# Patient Record
Sex: FEMALE | Race: BLACK OR AFRICAN AMERICAN | NOT HISPANIC OR LATINO | Employment: FULL TIME | URBAN - METROPOLITAN AREA
[De-identification: names, ages, dates, MRNs, and addresses within clinical notes are randomized per-mention and may not be internally consistent; named-entity substitution may affect disease eponyms.]

---

## 2022-01-02 ENCOUNTER — OFFICE VISIT (OUTPATIENT)
Dept: URGENT CARE | Facility: URGENT CARE | Age: 27
End: 2022-01-02
Payer: COMMERCIAL

## 2022-01-02 VITALS
TEMPERATURE: 100.7 F | OXYGEN SATURATION: 99 % | SYSTOLIC BLOOD PRESSURE: 143 MMHG | RESPIRATION RATE: 20 BRPM | DIASTOLIC BLOOD PRESSURE: 95 MMHG | WEIGHT: 167.25 LBS | HEART RATE: 125 BPM

## 2022-01-02 DIAGNOSIS — R82.90 ABNORMAL FINDING ON URINALYSIS: ICD-10-CM

## 2022-01-02 DIAGNOSIS — M54.6 ACUTE BILATERAL THORACIC BACK PAIN: Primary | ICD-10-CM

## 2022-01-02 LAB
ALBUMIN UR-MCNC: ABNORMAL MG/DL
APPEARANCE UR: CLEAR
BACTERIA #/AREA URNS HPF: ABNORMAL /HPF
BILIRUB UR QL STRIP: NEGATIVE
COLOR UR AUTO: YELLOW
GLUCOSE UR STRIP-MCNC: NEGATIVE MG/DL
HGB UR QL STRIP: ABNORMAL
KETONES UR STRIP-MCNC: >=80 MG/DL
LEUKOCYTE ESTERASE UR QL STRIP: ABNORMAL
MUCOUS THREADS #/AREA URNS LPF: PRESENT /LPF
NITRATE UR QL: POSITIVE
PH UR STRIP: 6 [PH] (ref 5–7)
RBC #/AREA URNS AUTO: ABNORMAL /HPF
SP GR UR STRIP: >=1.03 (ref 1–1.03)
SQUAMOUS #/AREA URNS AUTO: ABNORMAL /LPF
UROBILINOGEN UR STRIP-ACNC: 0.2 E.U./DL
WBC #/AREA URNS AUTO: ABNORMAL /HPF

## 2022-01-02 PROCEDURE — 87086 URINE CULTURE/COLONY COUNT: CPT | Performed by: PHYSICIAN ASSISTANT

## 2022-01-02 PROCEDURE — 81001 URINALYSIS AUTO W/SCOPE: CPT | Performed by: PHYSICIAN ASSISTANT

## 2022-01-02 PROCEDURE — 99203 OFFICE O/P NEW LOW 30 MIN: CPT | Performed by: PHYSICIAN ASSISTANT

## 2022-01-02 RX ORDER — CIPROFLOXACIN 500 MG/1
500 TABLET, FILM COATED ORAL 2 TIMES DAILY
Qty: 20 TABLET | Refills: 0 | Status: SHIPPED | OUTPATIENT
Start: 2022-01-02 | End: 2022-01-12

## 2022-01-02 RX ORDER — IBUPROFEN 600 MG/1
600 TABLET, FILM COATED ORAL EVERY 6 HOURS PRN
Qty: 30 TABLET | Refills: 0 | Status: SHIPPED | OUTPATIENT
Start: 2022-01-02

## 2022-01-02 ASSESSMENT — ENCOUNTER SYMPTOMS
FATIGUE: 0
NECK STIFFNESS: 0
JOINT SWELLING: 0
NECK PAIN: 0
FREQUENCY: 0
DYSURIA: 0
WOUND: 0
COLOR CHANGE: 0
ARTHRALGIAS: 0
CHILLS: 0
FLANK PAIN: 0
FEVER: 1
HEMATURIA: 0
BACK PAIN: 1
MYALGIAS: 1

## 2022-01-02 NOTE — PROGRESS NOTES
Montana Collado is a 26 year old who presents for the following health issues   HPI   Back Pain  Onset/Duration: today  Description:   Location of pain: chronic upper back but new mid and low back  Character of pain: sharp and dull ache  Pain radiation: none  New numbness or weakness in legs, not attributed to pain: No weakness, numbness, tingling.  No bladder or bowel dysfunction.  No swelling, redness, drainage or fevers.  No dysuria, urinary frequency, urgency or hematuria. But has fever here.  Intensity: moderate  Progression of Symptoms: worsening  History:   Specific cause: Yes, was moving this past weekend  Pain interferes with job: YES  History of back problems: Yes prior back problems  Any previous MRI or X-rays: None  Sees a specialist for back pain: No  Alleviating factors:   Improved by: rest    Precipitating factors:  Worsened by: Lifting, Bending, Standing, Walking and Coughing  Therapies tried and outcome: acetaminophen (Tylenol), heat, rest and sitting with minimal relief  Accompanying Signs & Symptoms:  Risk of Fracture: None  Risk of Cauda Equina: None  Risk of Infection: None  Risk of Cancer: None  Risk of Ankylosing Spondylitis: Onset at age <35, male, AND morning back stiffness  no     There is no problem list on file for this patient.    Current Outpatient Medications   Medication     etonogestrel (NEXPLANON) 68 MG IMPL     No current facility-administered medications for this visit.      No Known Allergies  Review of Systems   Constitutional: Positive for fever. Negative for chills and fatigue.   Genitourinary: Negative.  Negative for dysuria, flank pain, frequency, hematuria, urgency, vaginal bleeding and vaginal discharge.   Musculoskeletal: Positive for back pain and myalgias. Negative for arthralgias, gait problem, joint swelling, neck pain and neck stiffness.   Skin: Negative for color change, pallor, rash and wound.   All other systems reviewed and are negative.            Objective    BP (!) 143/95 (BP Location: Left arm, Patient Position: Sitting, Cuff Size: Adult Regular)   Pulse (!) 125   Temp (!) 100.7  F (38.2  C) (Oral)   Resp 20   Wt 75.9 kg (167 lb 4 oz)   SpO2 99%   Breastfeeding No   There is no height or weight on file to calculate BMI.  Physical Exam  Vitals and nursing note reviewed.   Constitutional:       General: She is not in acute distress.     Appearance: Normal appearance. She is well-developed and normal weight. She is not ill-appearing.   Cardiovascular:      Rate and Rhythm: Normal rate and regular rhythm.      Pulses: Normal pulses.      Heart sounds: Normal heart sounds, S1 normal and S2 normal. No murmur heard.  No friction rub. No gallop.    Pulmonary:      Effort: Pulmonary effort is normal. No accessory muscle usage, respiratory distress or retractions.      Breath sounds: Normal breath sounds. No decreased breath sounds, wheezing, rhonchi or rales.   Abdominal:      General: Abdomen is flat. Bowel sounds are normal.      Palpations: Abdomen is soft. There is no mass.      Tenderness: There is no abdominal tenderness. There is no right CVA tenderness, left CVA tenderness, guarding or rebound. Negative signs include Rueña's sign, Rovsing's sign and McBurney's sign.      Hernia: No hernia is present.   Musculoskeletal:      Thoracic back: Spasms and tenderness present. No swelling, edema, deformity, lacerations or bony tenderness. Decreased range of motion.   Skin:     General: Skin is warm and dry.      Comments: Distal pulses are 2+ and symmetric.  No peripheral edema.   Neurological:      Mental Status: She is alert and oriented to person, place, and time.      Sensory: Sensation is intact.      Motor: Motor function is intact.      Gait: Gait is intact. Gait normal.      Deep Tendon Reflexes: Reflexes are normal and symmetric.      Comments: Negative SLR test.   Psychiatric:         Mood and Affect: Mood normal.         Behavior: Behavior  normal.         Thought Content: Thought content normal.         Judgment: Judgment normal.       Results for orders placed or performed in visit on 01/02/22 (from the past 24 hour(s))   UA Macro with Reflex to Micro and Culture - lab collect    Specimen: Urine, Midstream   Result Value Ref Range    Color Urine Yellow Colorless, Straw, Light Yellow, Yellow    Appearance Urine Clear Clear    Glucose Urine Negative Negative mg/dL    Bilirubin Urine Negative Negative    Ketones Urine >=80 (A) Negative mg/dL    Specific Gravity Urine >=1.030 1.003 - 1.035    Blood Urine Trace (A) Negative    pH Urine 6.0 5.0 - 7.0    Protein Albumin Urine Trace (A) Negative mg/dL    Urobilinogen Urine 0.2 0.2, 1.0 E.U./dL    Nitrite Urine Positive (A) Negative    Leukocyte Esterase Urine Trace (A) Negative   Urine Microscopic Exam   Result Value Ref Range    Bacteria Urine Many (A) None Seen /HPF    RBC Urine 0-2 0-2 /HPF /HPF    WBC Urine 0-5 0-5 /HPF /HPF    Squamous Epithelials Urine Few (A) None Seen /LPF    Mucus Urine Present (A) None Seen /LPF       Assessment/Plan:  Acute bilateral thoracic back pain:  UA and micro is suspicious for UTI/pyelonephritis and will empirically treat with [id number].  Will also give tylenol/ibuprofen as needed for pain/fever.  Will send for UC to help guide abx treatment.  Recommend increase fluids, regular voids, proper wiping techniques and voiding after intercourse.  To the ER if worsening symptoms.  Recheck in clinic if symptoms worsen or if symptoms do not improve.  -     UA Macro with Reflex to Micro and Culture - lab collect; Future  -     UA Macro with Reflex to Micro and Culture - lab collect  -     Urine Microscopic Exam  -     Urine Culture  -     ciprofloxacin (CIPRO) 500 MG tablet; Take 1 tablet (500 mg) by mouth 2 times daily for 10 days  -     ibuprofen (ADVIL/MOTRIN) 600 MG tablet; Take 1 tablet (600 mg) by mouth every 6 hours as needed for moderate pain    Abnormal finding on  urinalysis        Ashley See KADEN Hsieh

## 2022-01-03 LAB — BACTERIA UR CULT: NORMAL

## 2024-02-17 ENCOUNTER — OFFICE VISIT (OUTPATIENT)
Dept: URGENT CARE | Facility: URGENT CARE | Age: 29
End: 2024-02-17
Payer: COMMERCIAL

## 2024-02-17 VITALS
WEIGHT: 153.5 LBS | OXYGEN SATURATION: 100 % | TEMPERATURE: 101 F | HEART RATE: 121 BPM | DIASTOLIC BLOOD PRESSURE: 91 MMHG | SYSTOLIC BLOOD PRESSURE: 136 MMHG

## 2024-02-17 DIAGNOSIS — J45.21 MILD INTERMITTENT ASTHMA WITH ACUTE EXACERBATION: ICD-10-CM

## 2024-02-17 DIAGNOSIS — R06.02 SOB (SHORTNESS OF BREATH): ICD-10-CM

## 2024-02-17 DIAGNOSIS — R50.9 FEVER, UNSPECIFIED FEVER CAUSE: ICD-10-CM

## 2024-02-17 DIAGNOSIS — J06.9 VIRAL UPPER RESPIRATORY TRACT INFECTION WITH COUGH: Primary | ICD-10-CM

## 2024-02-17 LAB
FLUAV AG SPEC QL IA: NEGATIVE
FLUBV AG SPEC QL IA: NEGATIVE

## 2024-02-17 PROCEDURE — 99214 OFFICE O/P EST MOD 30 MIN: CPT | Performed by: PHYSICIAN ASSISTANT

## 2024-02-17 PROCEDURE — 87804 INFLUENZA ASSAY W/OPTIC: CPT | Performed by: PHYSICIAN ASSISTANT

## 2024-02-17 RX ORDER — ALBUTEROL SULFATE 90 UG/1
1-2 AEROSOL, METERED RESPIRATORY (INHALATION)
COMMUNITY
Start: 2023-07-17

## 2024-02-17 RX ORDER — PREDNISONE 20 MG/1
40 TABLET ORAL DAILY
Qty: 10 TABLET | Refills: 0 | Status: SHIPPED | OUTPATIENT
Start: 2024-02-17 | End: 2024-02-22

## 2024-02-17 ASSESSMENT — ENCOUNTER SYMPTOMS
WOUND: 0
ENDOCRINE NEGATIVE: 1
ARTHRALGIAS: 0
CARDIOVASCULAR NEGATIVE: 1
LIGHT-HEADEDNESS: 0
WEAKNESS: 0
NAUSEA: 0
NECK STIFFNESS: 0
BACK PAIN: 0
MUSCULOSKELETAL NEGATIVE: 1
DIARRHEA: 0
ALLERGIC/IMMUNOLOGIC NEGATIVE: 1
NECK PAIN: 0
COUGH: 1
SHORTNESS OF BREATH: 1
FEVER: 1
CHILLS: 0
MYALGIAS: 0
HEADACHES: 0
VOMITING: 0
PALPITATIONS: 0
JOINT SWELLING: 0
DIZZINESS: 0
RHINORRHEA: 0
EYES NEGATIVE: 1
SORE THROAT: 0

## 2024-02-17 NOTE — PROGRESS NOTES
Chief Complaint:     Chief Complaint   Patient presents with    Shortness of Breath     Chest tightens, sob, had sx last yr dx with bronchitis    Fever     Onset - yesterday       Results for orders placed or performed in visit on 02/17/24   Influenza A & B Antigen - Clinic Collect     Status: Normal    Specimen: Nose; Swab   Result Value Ref Range    Influenza A antigen Negative Negative    Influenza B antigen Negative Negative    Narrative    Test results must be correlated with clinical data. If necessary, results should be confirmed by a molecular assay or viral culture.       Medical Decision Making:    Vital signs reviewed by Christian Goodrich PA-C  BP (!) 136/91   Pulse (!) 121   Temp (!) 101  F (38.3  C) (Tympanic)   Wt 69.6 kg (153 lb 8 oz)   SpO2 100%     Differential Diagnosis:  URI Adult/Peds:  Bronchitis-viral, Influenza, Pneumonia, and Viral upper respiratory illness        ASSESSMENT    1. Viral upper respiratory tract infection with cough    2. Mild intermittent asthma with acute exacerbation    3. Fever, unspecified fever cause    4. SOB (shortness of breath)        PLAN    Patient is in no acute distress.    Temp is 101 in clinic today, lung sounds were clear, and O2 sats at 100% on RA.    Asthma is not controlled at this time.  Rx for Prednisone sent in.  Patient declined refill of Albuterol inhaler.    Influenza was negative.  Rest, Push fluids, vaporizer, elevation of head of bed.  Ibuprofen and or Tylenol for any fever or body aches.  Over the counter cough suppressant- PRN- as discussed.   If symptoms worsen, recheck immediately otherwise follow up with your PCP in 1 week if symptoms are not improving.  Worrisome symptoms discussed with instructions to go to the ED.  Patient verbalized understanding and agreed with this plan.    Labs:    Results for orders placed or performed in visit on 02/17/24   Influenza A & B Antigen - Clinic Collect     Status: Normal    Specimen: Nose; Swab   Result  Value Ref Range    Influenza A antigen Negative Negative    Influenza B antigen Negative Negative    Narrative    Test results must be correlated with clinical data. If necessary, results should be confirmed by a molecular assay or viral culture.        Vital signs reviewed by Christian Goodrich PA-C  BP (!) 136/91   Pulse (!) 121   Temp (!) 101  F (38.3  C) (Tympanic)   Wt 69.6 kg (153 lb 8 oz)   SpO2 100%     Current Meds      Current Outpatient Medications:     albuterol (PROAIR HFA/PROVENTIL HFA/VENTOLIN HFA) 108 (90 Base) MCG/ACT inhaler, Inhale 1-2 puffs into the lungs, Disp: , Rfl:     etonogestrel (NEXPLANON) 68 MG IMPL, Inject 1 each Subcutaneous, Disp: , Rfl:     ibuprofen (ADVIL/MOTRIN) 600 MG tablet, Take 1 tablet (600 mg) by mouth every 6 hours as needed for moderate pain, Disp: 30 tablet, Rfl: 0    predniSONE (DELTASONE) 20 MG tablet, Take 2 tablets (40 mg) by mouth daily for 5 days, Disp: 10 tablet, Rfl: 0      Respiratory History    occasional episodes of bronchitis      SUBJECTIVE    HPI: Tobias Haynes is an 28 year old female who presents with chest congestion, cough nonproductive, occasional, fever, and shortness of breath.  Symptoms began 1  days ago and has unchanged.  There is no wheezing and chest pain.  Patient is eating and drinking well.  No fever, nausea, vomiting, or diarrhea.    Patient denies any recent travel or exposure to known COVID positive tested individual.      ROS:     Review of Systems   Constitutional:  Positive for fever. Negative for chills.   HENT:  Positive for congestion. Negative for ear pain, rhinorrhea and sore throat.    Eyes: Negative.    Respiratory:  Positive for cough and shortness of breath.    Cardiovascular: Negative.  Negative for chest pain and palpitations.   Gastrointestinal:  Negative for diarrhea, nausea and vomiting.   Endocrine: Negative.    Genitourinary: Negative.    Musculoskeletal: Negative.  Negative for arthralgias, back pain, joint swelling,  myalgias, neck pain and neck stiffness.   Skin: Negative.  Negative for rash and wound.   Allergic/Immunologic: Negative.  Negative for immunocompromised state.   Neurological:  Negative for dizziness, weakness, light-headedness and headaches.         Family History   No family history on file.     Problem history  There is no problem list on file for this patient.       Allergies  No Known Allergies     Social History  Social History     Socioeconomic History    Marital status: Single     Spouse name: Not on file    Number of children: Not on file    Years of education: Not on file    Highest education level: Not on file   Occupational History    Not on file   Tobacco Use    Smoking status: Every Day     Years: 4     Types: Cigarettes, Vaping Device    Smokeless tobacco: Never   Substance and Sexual Activity    Alcohol use: Yes     Comment: occ    Drug use: Not Currently    Sexual activity: Not on file   Other Topics Concern    Not on file   Social History Narrative    Not on file     Social Determinants of Health     Financial Resource Strain: Not on file   Food Insecurity: Not on file   Transportation Needs: Not on file   Physical Activity: Not on file   Stress: Not on file   Social Connections: Not on file   Interpersonal Safety: Not on file   Housing Stability: Not on file        OBJECTIVE     Vital signs reviewed by Christian Goodrich PA-C  BP (!) 136/91   Pulse (!) 121   Temp (!) 101  F (38.3  C) (Tympanic)   Wt 69.6 kg (153 lb 8 oz)   SpO2 100%      Physical Exam  Vitals and nursing note reviewed.   Constitutional:       General: She is not in acute distress.     Appearance: She is well-developed. She is not ill-appearing, toxic-appearing or diaphoretic.   HENT:      Head: Normocephalic and atraumatic.      Right Ear: Hearing, tympanic membrane, ear canal and external ear normal. Tympanic membrane is not perforated, erythematous, retracted or bulging.      Left Ear: Hearing, tympanic membrane, ear canal  and external ear normal. Tympanic membrane is not perforated, erythematous, retracted or bulging.      Nose: Congestion present. No mucosal edema or rhinorrhea.      Right Sinus: No maxillary sinus tenderness or frontal sinus tenderness.      Left Sinus: No maxillary sinus tenderness or frontal sinus tenderness.      Mouth/Throat:      Pharynx: No pharyngeal swelling, oropharyngeal exudate, posterior oropharyngeal erythema or uvula swelling.      Tonsils: No tonsillar exudate or tonsillar abscesses. 0 on the right. 0 on the left.   Eyes:      General:         Right eye: No discharge.         Left eye: No discharge.      Pupils: Pupils are equal, round, and reactive to light.   Cardiovascular:      Rate and Rhythm: Normal rate and regular rhythm.      Heart sounds: Normal heart sounds. No murmur heard.     No friction rub. No gallop.   Pulmonary:      Effort: Pulmonary effort is normal. No respiratory distress.      Breath sounds: Normal breath sounds. No decreased breath sounds, wheezing, rhonchi or rales.   Chest:      Chest wall: No tenderness.   Abdominal:      General: Bowel sounds are normal. There is no distension.      Palpations: Abdomen is soft. There is no mass.      Tenderness: There is no abdominal tenderness. There is no guarding.   Musculoskeletal:      Cervical back: Normal range of motion and neck supple.   Lymphadenopathy:      Head:      Right side of head: No submental, submandibular, tonsillar, preauricular or posterior auricular adenopathy.      Left side of head: No submental, submandibular, tonsillar, preauricular or posterior auricular adenopathy.      Cervical: No cervical adenopathy.      Right cervical: No superficial or posterior cervical adenopathy.     Left cervical: No superficial or posterior cervical adenopathy.   Skin:     General: Skin is warm and dry.      Findings: No rash.   Neurological:      Mental Status: She is alert and oriented to person, place, and time.      Cranial  Nerves: No cranial nerve deficit.      Deep Tendon Reflexes: Reflexes are normal and symmetric.   Psychiatric:         Behavior: Behavior normal. Behavior is cooperative.         Thought Content: Thought content normal.         Judgment: Judgment normal.           Christian Goodrich PA-C  2/17/2024, 1:02 PM

## 2024-04-20 NOTE — TELEPHONE ENCOUNTER
FUTURE VISIT INFORMATION      FUTURE VISIT INFORMATION:  Date: 4.23.24  Time: 9:00  Location: [facility] [place]  REFERRAL INFORMATION:  Referring provider:  Seth  Referring providers clinic:  [facility]  Reason for visit/diagnosis  rash, request for [place] panel patch test, per [place] at [facility], med recs in care everywhere      RECORDS REQUESTED FROM:       Clinic name Comments Records Status Imaging Status   [facility] 4.19.24  Seth LÓPEZ

## 2024-04-22 NOTE — PROGRESS NOTES
[facility] Dermato-allergology Note  Office visit  Encounter Date: Apr 23, 2024  ____________________________________________    CC: Allergy Consult (Rash, consult for patch testing from [facility] (no referral in chart))      HPI:  (Apr 23, 2024)  Ms. Tobias Haynes is a(n) 28 year old female who presents today as new patient for allergy consultation  - Referred by Dr. Josiah Ann for [place] panel patch test for rash. 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  Recently reacted to coban on her arm.  At home seems to get rashes on face- of note rubbing alcohol helps the rash.   - Recently had Nexplanon replaced ~1.5 weeks ago (see 4/11/24 note by Lashawn Christianson, NP with [facility])  - Wrap that was used on arm caused itchy rash; she has never had a wrap on as long as it was one  - Iodine was used by OB/GYN's staff to disinfect the area  - Wears latex-free gloves at her job as a phlebotomist  - She, mom, and sister all have itchy nose  - For her, will spread out from her nose to her face; lasts for a couple hours  - Has had hay fever and spontaneous allergies since high school  - [place] is a trigger in late spring  - Asthma from childhood to present  - Uses albuterol infrequently  - Dyes her hair, but last done 2021  - Otherwise feeling well in usual state of health    Physical Exam:  General: In no acute distress, well-developed, well-nourished  Eyes: no conjunctivitis  ENT: no signs of rhinitis   Pulmonary: no wheezing or coughing  Skin: Focused examination of the face, hairline, arms, hands was performed.  - On the left upper arm immediately above the elbow, there is an eczematous reaction with slight hyperpigmentation, xerosis, and papular vesicles present.   - Hyperpigmentation on dorsal lateral and palmar right hand.  - Eczematous plaques on the forehead along the hairline.    Past Medical History:   There is no problem list on  file for this patient.    No past medical history on file.    Allergies:  No Known Allergies    Medications:  Current Outpatient Medications   Medication Sig Dispense Refill    albuterol (PROAIR HFA/PROVENTIL HFA/VENTOLIN HFA) 108 (90 Base) MCG/ACT inhaler Inhale 1-2 puffs into the lungs      etonogestrel (NEXPLANON) 68 MG IMPL Inject 1 each Subcutaneous      ibuprofen (ADVIL/MOTRIN) 600 MG tablet Take 1 tablet (600 mg) by mouth every 6 hours as needed for moderate pain 30 tablet 0     No current facility-administered medications for this visit.       Social History:  The patient works as a phlebotomist. Patient has the following hobbies or non-occupational exposure: spending time with her son.    Family History:  No family history on file.    Previous Labs, Allergy Tests, Dermatopathology, Imagin[facility] Dr. Josiah Ann ([facility])  From [facility]:  History of a few different allergy concerns. Nose congestion, sneezing, itchy watery eyes. All year-round. Seasonally worse. Few years. Medications are not adequately helping. At times symptoms can be bad. Interested in allergy testing.    She currently uses nonlatex gloves at work. Has noticed itching, burning, rash and discoloration on both hands. For the past 1 year. Interested in testing for allergies for that.    Itching of the tip of the nose as well. Sometimes itching of the cheek and forehead as well. No obvious rash. This has been ongoing for more than 1 year.    Currently does have history of asthma. Per patient controlled. Using albuterol as needed.    History of local reaction with itching and redness after using wrap     Impressions:  Rash-both hands  Itching-nose, cheeks, forehead  Allergic rhinitis  Allergic conjunctivitis  Inadequate response to treatment  Per patient suspected allergy to nonlatex gloves     Recommendations:  We talked about allergic and nonallergic causes  We talked about allergy skin testing versus blood testing  Check blood test  for environmental allergies  We talked about allergy patch testing. Suggested referral to Dr. Francisco Powers for possible [place] panel patch testing and second opinion regarding skin rash with discoloration affecting both hands that is possibly after nonlatex glove exposure per patient  Suggested triamcinolone-0.1% ointment topically twice daily as needed  Flonase 2 sprays each nostril daily  Astelin 2 sprays each nostril twice daily  Pataday-0.2% eyedrops-1 drop in each eye once daily as needed  Zyrtec 10 mg by mouth once or twice daily  Other self-care techniques discussed  All questions answered  Follow-up in a month or sooner if problems     2/22/24 ED visit for Acute Bronchitis with Asthma ([facility])  From HPI:  Presents to the emergency department for evaluation of cough. The patient states that she has had a productive cough since Saturday 2/17. She states that she did have a fever of 101 F. She states that she went to urgent care where she tested negative for influenza. She states that was given prednisone and took this for two nights. She states that this did help with her cough. She states that today her cough is worse and is accompanied with wheezing. She also endorses rhinorrhea. She states that she has been using over the counter cough medications. She also notes that she tested negative for COVID-19 with an at home test kit. She also reports that she has a history of asthma and did use her inhaler this morning. She denies sore throat, and taking prescription medications for this. She denies further fevers or chest pain or pleuritic pain.    From A&P:  Vitally stable here although slightly tachycardic. She is overall well appearing. She is not hypoxic or in respiratory distress. She had recent influenza testing which was negative. COVID PCR testing here is negative. Chest x-ray obtained shows no lobar consolidation or significant pleural effusion. Denies chest pain and I doubt ACS or PE in light  of the above presentation and symptoms. Overall suspect acute bronchitis. We will extend her course of steroids and prescribed medications below. She request for her own nebulizer which was written for her. Recommend she follow-up with her primary care doctor regards to her ER visit today. She is comfortable this plan. Discussed return precautions for the emergency department. All questions as per discharge.    Referred By: Josiah Ann MD ([facility])  [id number] [place]  [address] [place] [address]     Allergy Tests:  Ordered by Dr. Ann on 4/19/24 and Pending as of 4/22/24  ALTERNARIA ALTERNATA (M6) IGE   ASPERGILLUS FUMIGATUS IGE   BIRCH (T3) IGE  CAT DANDER (E1) IGE   CLADOSPORIUM HERBARUM IGE   COCKLEBUR IGE   COCKROACH (I6) IGE   COMMON RAGWEED (W1) IGE   D FARINAE (D2) IGE   D PTERONYSSINUS (D1) IGE   DOG DANDER (E5) IGE   ELM (T8) IGE   EPICOCCUM PURPUR IGE   MAPLE (BOX ELDER) (T1)IGE   ROUGH MARSH ELDER (W16) IGE  AGUSTÍN GRASS (G6) IGE   WHITE GOPAL (T15) IGE   WHITE OAK (T7) IGE     Order for Future Allergy Testing:    [x] Outpatient  [] Inpatient: [facility]..../ Bed ....       Skin Atopy (atopic dermatitis) [x] Yes   [] No .........see HPI  Contact allergies:   [x] Yes   [] No ..........see HPI  Hand eczema:   [] Yes   [x] No           Leading hand:   [x] R   [] L       [] Ambidextrous         Drug allergies:        [] Yes   [x] No  which?......    Urticaria/Angioedema  [] Yes   [x] No .........  Food Allergy:  [] Yes   [x] No  which?......  Pets :  [] Yes   [x] No  which?......none at home and no reaction to cats or dogs        [x]  Rhinitis   [] Conjunctivitis   [] Sinusitis   [] Polyposis   [] Otitis   [] Pharyngitis         []  Postnasal drip    []  none  Operations:   [] Tonsils   [] Septum   [] Sinus   [] Polyposis        [x] Asthma bronchiale   [] Coughing      []  none  Symptoms (mostly Rhinoconjunctivitis and Asthma) aggravated by:  Season   [] I   [] II   [] III   [x] IV   [x]V   [x]VI    []VII   []VIII   []IX   []X   []XI   []XII     [x] perennial   Day time      [] morning   [] noon      [] evening        [] night    [] whole day........  [x]  none  Location/changes    [] inside        [] outside   [] mountains    [] sea     [] others.............   [x]  none  Triggers, specific     [] animals     [] plants     [] dust              [] others ...........................    [x]  none  Triggers, others       [] work          [] psyche    [] sport            [] others .............................  [x]  none  Irritant                [] phys efforts [] smoke    [] heat/cold     [] odors  []others............... [x]  none    Order for PATCH TESTS  Reason for tests (suspected allergy): recurrent eczema on hands, face, and acutely on upper arms to forearms  Known previous allergies: none  Standardized panels  [x] Standard panel (40 tests)  [x] Preservatives & Antimicrobials (31 tests)  [x] Emulsifiers & Additives (25 tests)   [x] Perfumes/Flavours & Plants (25 tests)  [] Hairdresser panel (12 tests)  [x] Rubber Chemicals (22 tests)  [x] Plastics (26 tests)  [] Colorants/Dyes/Food additives (20 tests)  [] Metals (implants/dental) (24 tests)  [] Local anaesthetics/NSAIDs (13 tests)  [] Antibiotics & Antimycotics (14 tests)   [] Corticosteroids (15 tests)   [] Photopatch test (62 tests)   [] others: ...      [] Patient's own products: ...  DO NOT test if chemical or biological identity is unknown!   always ask from patient the product information and safety sheets (MSDS)       Order for PRICK TESTS (SEE RESULTS FROM DR. HOOKER'S BLOOD TESTS 1ST)    Reason for tests (suspected allergy): perennial RC with seasonal aggravation in late spring/early summer  Known previous allergies: see above prior records    Standardized prick panels  [] Atopic panel (20 tests)  [] Pediatric Panel (12 tests)  [] Milk, Meat, Eggs, Grains (20 tests)   [] Dust, Epithelia, Feathers (10 tests)  [] Fish, Seafood, Shellfish (17  tests)  [] Nuts, Beans (14 tests)  [] Spice, Vegetable, Fruit (17 tests)  [] Pollen Panel = Tree, Grass, Weed (24 tests)  [] Others: ...      [] Patient's own products: ...  DO NOT test if chemical or biological identity is unknown!   always ask from patient the product information and safety sheets (MSDS)     Standardized intradermal tests  [] Penicillium notatum [] Aspergillus fumigatus [] House dust mites D.far & D. pteron  [] Cat    [] dog  [] Others: ...  [] Bee venom   [] Wasp venom  !!Specific protocol with dilutions!!       Order for Drug allergy tests (prick & Intradermal & patch tests)    [] Penicillin G  [] Ampicillin [] Cefazolin   [] Ceftriaxone   [] Ceftazidime  [] Bactrim    [] Others: ...  Order for ... as test date    [] Patient needs consultation with Allergy team (changes of tests may apply)  [x] Tests discussed with Allergy team (can have direct appointment for allergy tests)     ________________________________    Assessment & Plan:    ==> Final Diagnosis:     # Recurrent dermatitis on hands, face, and acute flares to coban on upper forearms  DDx: Atopic contact dermatitis (Occupational? To rubber gloves? Disinfectants/soaps?)  DDx: Irritant dermatitis with atopic dermatitis  * chronic illness with exacerbation, progression, side effects from treatment    # Suspicion for atopic predisposition with:  Perennial RC with some seasonal aggravation in late spring/early summer  Dry, hypersensitive skin (atopic dermatitis?)  * chronic illness with exacerbation, progression, side effects from treatment    These conclusions are made at the best of one's knowledge and belief based on the provided evidence such as patient's history and allergy test results and they can change over time or can be incomplete because of missing information.    ==> Treatment Plan:    Until patch testing can be completed:  >> Start tacrolimus (Protopic) 0.1% ointment BID.  >> Reduce triamcinolone 0.1% ointment to using only for  acute flares until resolved, and then on weekends in place of tacrolimus if needed.    Procedures Performed: None    Staff and Scribe:  Provider    Scribe Disclosure:   I, JAYME ARSHAD, am serving as a scribe to document services personally performed by Francisco Powers MD based on data collection and the provider's statements to me.     Staff Physician Comments:  I was present with the scribe who participated in the documentation of the note. I have verified the history and personally performed the physical exam and medical decision making. I agree with the assessment and plan as documented in the note. I have reviewed and if necessary amended the note.      Francisco Powers MD  Professor  Head of Dermato-Allergy Division  Department of Dermatology  [facility]    Follow-up in Derm-Allergy clinic for patch tests (and prick tests pending Dr. Ann's blood results from 4/19/24); stop all antihistamines 5-7 days prior    I spent a total of 30 minutes with Tobias HUTCHINSON Ivy. This time was spent counseling the patient and/or coordinating care, explaining the allergy tests, performing allergy tests and assessing the clinical relevance.

## 2024-04-23 ENCOUNTER — PRE VISIT (OUTPATIENT)
Dept: ALLERGY | Facility: CLINIC | Age: 29
End: 2024-04-23

## 2024-04-23 ENCOUNTER — OFFICE VISIT (OUTPATIENT)
Dept: ALLERGY | Facility: CLINIC | Age: 29
End: 2024-04-23
Payer: COMMERCIAL

## 2024-04-23 DIAGNOSIS — L23.5 ALLERGIC DERMATITIS DUE TO OTHER CHEMICAL PRODUCT: Primary | ICD-10-CM

## 2024-04-23 DIAGNOSIS — H10.10 SEASONAL AND PERENNIAL ALLERGIC RHINOCONJUNCTIVITIS: ICD-10-CM

## 2024-04-23 DIAGNOSIS — L20.89 OTHER ATOPIC DERMATITIS: ICD-10-CM

## 2024-04-23 DIAGNOSIS — L23.9 OCCUPATIONAL ALLERGIC CONTACT DERMATITIS: ICD-10-CM

## 2024-04-23 DIAGNOSIS — L30.9 HAND DERMATITIS: ICD-10-CM

## 2024-04-23 DIAGNOSIS — Z88.9 ATOPY: ICD-10-CM

## 2024-04-23 DIAGNOSIS — J30.2 SEASONAL AND PERENNIAL ALLERGIC RHINOCONJUNCTIVITIS: ICD-10-CM

## 2024-04-23 DIAGNOSIS — J30.89 SEASONAL AND PERENNIAL ALLERGIC RHINOCONJUNCTIVITIS: ICD-10-CM

## 2024-04-23 PROCEDURE — 99214 OFFICE O/P EST MOD 30 MIN: CPT | Performed by: DERMATOLOGY

## 2024-04-23 ASSESSMENT — ASTHMA QUESTIONNAIRES: ACT_TOTALSCORE: 18

## 2024-04-23 NOTE — PATIENT INSTRUCTIONS
_____________________________    PATCH TESTING    WHAT IS A PATCH TEST ?    A patch test is a way of identifying whether a substance has caused a delayed reaction with skin inflammation, such as contact eczema or delayed (after days) reactions to drugs. We will use various different types of test compounds, which may include common allergens in occupation and daily life such as  preservatives, fragrances or even drugs. Most of the time we will use standardized, prefabricated test solutions and the choice of the substances and series tested will depend on the history of the allergic reactions. Sometimes we will test your own products you are exposed at home or at work. In order to avoid severe toxic reactions we need detailed information s or safety sheets about each of these test compounds.    HOW IS A PATCH TEST PERFORMED ?    You will be given three appointments to complete this test. On the first appointment the nurse will apply 100-180 small test chambers each one of them containing a different allergen on your back and/or on your arms. We will use hypoallergenic and somehow waterproof adhesive tape. Afterwards the different sites will be marked with a waterproof marker. These patches must stay in place for 2 days. On the second appointment the patches will be removed and the allergy doctor/nurse will evaluate the skin reactions and maybe re-apply the marks. On the third appointment the allergy doctor will re-evaluate possible allergic reactions and discuss with you the nature of the allergens you react to and how to avoid them.    AVOID THE FOLLOWING:    DO NOT wash your back and other test areas during the entire test period (3-5 days), NO bathing or swimming  AVOID strenuous exercise with sweating  DO NOT scratch the test area  AVOID exposure to UV irradiation (sun, therapy)    Patch tests should be performed when the allergy is resolved  Remove patch tests only if severe reaction (itch, pain, blistering)  and report to your doctor immediately. Darvin then the locations of each test field  If patch tests peel off, then try to fix them and record time and darvin test field  No oral steroids (e.g. Prednisone) 1 month prior to tests    WHAT ARE THE POSSIBLE RISKS OF PATCH TESTS ?    If you are allergic to a compound tested you will develop after a few days localized skin reactions similar to your previous allergic reaction. This includes formation of red, itchy skin lesions of few mm to cm with small vesicles and even blisters. The lesions will fade off over days and weeks and might sometimes leave for a few months some skin pigmentations. In rare cases a localized reaction to patch tests can become generalized. The tests with your own products might have some risks because they are not standardized and unanticipated reactions can occur. We need as much information as possible to evaluate if your own products are safe to test and under what conditions. This has to be evaluated for each individual product.        ? WHAT ARE THE PREPARATIONS NEEDED FOR THESE VARIOUS ALLERGY TESTS ?    Some medications can affect the reactions to allergens during the tests. Therefore reveal all the medications you take to the allergy team and the doctor will discuss with you the medications before/during the tests. Normally, you have to respect following rules (unless otherwise instructed by doctor):  For prick, intradermal and provocation tests stop antihistamines (e.g. loratadine (Claritin), cetirizine (Zyrtec), fexofenadine (Allegra) etc 1 week prior to the appointment   For patch tests and provocation tests, stop systemic corticosteroids 1 month and topical steroids 1 week prior to tests  Eat normally and take a shower before tests    _____________________________    SKIN PRICK TESTING    WHAT IS A SKIN PRICK TEST (SPT) ?    A skin prick test (SPT) is a simple and reliable diagnostic test used to identify substances ( allergens )  responsible for triggering the symptoms of allergy. The basic SPT panel includes common allergens, such as house dust mites, molds, dog and cat allergens and grass/tree pollen allergens. We have other more specialized series for various food allergens and sometimes we test your own food directly on your skin. Tests will be usually performed on the skin of the forearm (rarely on back). The skin may develop a red and itchy reaction which can be an indication of an allergy to to tested substance, but will be confirmed by discussion with the allergy specialist    HOW IS A SPT PERFORMED ?    The skin will be disinfected with 70% Isopropanol alcohol, then marked and numbered with a pen to identify the areas where the specific allergens will be tested. Afterwards a drop of the allergen solution will be placed on the skin and then the skin gently pricked using the tip of a specially designed prick needle. With this procedure the most superficial part of the skin will be pierced to allow the test solution to diffuse into the skin and make contact with the reactive immune cells. After 15-30min the area will be examined and evaluated for possible allergic reactions.        WHAT ARE THE POSSIBLE RISKS OF SPT ?    If the skin reacts it will develop red, itchy wheals of 5-30mm diameter. The wheal and itch will usually disappear spontaneously after 1-2 hours. Sometimes there might be a delayed reactions after days and this has to be reported to the treating allergy specialist (could be another kind of reaction pattern and important piece of information). Rarely there are more serious generalized allergic reactions observed and therefore you will be under observation of the allergy team during the entire procedure. There is a small risk for some bleeding and skin infection by the SPT.    _____________________________    INTRADERMAL TESTS      WHAT IS AN INTRADERMAL TEST (IDT) ?    An intradermal test (IDT) is basically a  continuation of the SPT and is sometimes proposed if the skin prick test is negative and the person is strongly suspected to have an allergy to a particular substance. IDT is particularly used for diagnosis of drug allergies and only sterile solutions will be tested by IDT. Because more allergen is delivered to the skin than in SPT the IDT can add more sensitivity to the allergy tests, but with a higher potential risk.     HOW IS AN INTRADERMAL TEST (IDT) PERFORMED ?    A small amount (~ 0.05ml) of the suspected allergen is injected with a very fine insulin needle just beneath the skin. The injections are made at spaced intervals after disinfection and marking of the skin areas. The tests are usually performed on the forearm (rarely back). The initial test will be started with a very diluted solution and if the results are negatives the procedure might be repeated with serial dilutions of higher concentrations. Therefore, the estimated duration of this test is about 45-60 min. Sometimes we observe delayed type reactions to the intradermal tests after 1-4 days and if this is the case take a photo and inform our staff and load the photo into [employer]. Best would be to take the photos with a ruler that we know at which position the positive test was.          WHAT ARE THE POSSIBLE RISKS OF IDT ?    If the skin reacts it will develop red, itchy wheals of 5-30mm diameter. The wheal and itch will usually disappear spontaneously after 1-2 hours. Sometimes there might be a delayed reactions after days and this has to be reported to the treating allergy specialist (could be another kind of reaction pattern and important piece of information). Rarely there are more serious generalized allergic reactions observed and therefore you will be under observation of the allergy team during the entire procedure. Only sterile solutions will be used for injections, but there is still a small risk for infections or unpredictable local  toxic reactions by the allergens. Some transient bleeding might occur.     _____________________________      ORAL PROVOCATION TEST      WHAT IS AN  ORAL PROVOCATION TEST (OPT) ?    An oral provocation test (OPT) is a procedure primarily used to exclude a specific allergy to a particular drug or food. These substances are then administered orally, rarely in case of drugs by subcutaneous or intravenous injections. This test is only conducted if the skin prick and intradermal and/or patch tests were negatives. A formal written consent will be taken prior to the provocation test.         HOW IS AN ORAL PROVOCATION TEST PERFORMED?    For oral (food/drugs) provocation tests you will have to ingest the food or drug hidden in a capsule or in its natural form. The test will usually be placebo-controlled and will include a capsule or other application form not containing the suspected allergen, but non-reactive Mannitol sugar. The various drugs/food will be given at specific time intervals under strict medical supervision.     Two to six serial doses containing the test food or drug will given at normally 30min time intervals, which might vary for each individual. You will be required to stay at the clinic at all times so that the allergy doctors and nurses can early recognize and treat immediately possible allergic reactions. After the last dose you have to stay during at least 1h for observation. The entire procedure will take about 2-6hours, depending on the number of incremental doses and the observation time.     WHAT ARE THE POSSIBLE RISKS OF ORAL PROVOCATION TEST ?    The provocation tests have the highest risk potential of all the tests and therefore close observation is mandatory. The entire procedure will be discussed prior to the test (except when the placebo will be given). The reactions can go from local allergic reactions to more severe generalized reactions. Therefore, we will not perform provocation tests  without prior evaluation by prick or intradermal tests and we plan to exclude an allergy by this test. If there is a higher risk, the test will be performed in a bed and with a secure intravenous access with infusion. The medication of a severe allergic reactions include high dose corticosteroids, antihistamines and if necessary epinephrine (Epi-Pen).    Useful Contact Information    Change of appointments or allergy-related [phone]  Email: [facility] [email or web address]  Location/address: [address] [place] [address]    If you develop any serious or adverse allergic reaction after office hours please seek immediate medical assistance at the nearest clinic or emergency room.     If you have questions or concerns regarding your Allergy Clinic bill or cost of care estimates, please reach out to our financial services at [email or web address]    CPT code for patch testing: [id number]-98812 (Contact your insurance provider to ensure coverage)    Customer Service    Ph: [phone] or  [phone]    Hours of operation:  M -Th 8:00am - 5:30pm  F 9:00am - 4:30pm    Cost of Care/Estimates Team    Ph: [phone]    Hours of operation:   -Th 8:00am - 3:00pm  F 9:00am - 3:00pm

## 2024-04-23 NOTE — LETTER
4/23/2024         RE: Tobias Haynes  [address]  [facility] [address]        Dear Colleague,    Thank you for referring your patient, Tobias Haynes, to the [facility] [place]. Please see a copy of my visit note below.    [facility] Dermato-allergology Note  Office visit  Encounter Date: Apr 23, 2024  ____________________________________________    CC: Allergy Consult (Rash, consult for patch testing from [facility] (no referral in chart))      HPI:  (Apr 23, 2024)  Ms. Tobias Haynes is a(n) 28 year old female who presents today as new patient for allergy consultation  - Referred by Dr. Josiah Ann for [place] [employer] panel patch test for rash. 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  Recently reacted to coban on her arm.  At home seems to get rashes on face- of note rubbing alcohol helps the rash.   - Recently had Nexplanon replaced ~1.5 weeks ago (see 4/11/24 note by Lashawn Christianson, NP with [facility])  - Wrap that was used on arm caused itchy rash; she has never had a wrap on as long as it was one  - Iodine was used by OB/GYN's staff to disinfect the area  - Wears latex-free gloves at her job as a phlebotomist  - She, mom, and sister all have itchy nose  - For her, will spread out from her nose to her face; lasts for a couple hours  - Has had hay fever and spontaneous allergies since high school  - [place] is a trigger in late spring  - Asthma from childhood to present  - Uses albuterol infrequently  - Dyes her hair, but last done 2021  - Otherwise feeling well in usual state of health    Physical Exam:  General: In no acute distress, well-developed, well-nourished  Eyes: no conjunctivitis  ENT: no signs of rhinitis   Pulmonary: no wheezing or coughing  Skin: Focused examination of the face, hairline, arms, hands was performed.  - On the left upper arm immediately above the elbow, there is an eczematous  reaction with slight hyperpigmentation, xerosis, and papular vesicles present.   - Hyperpigmentation on dorsal lateral and palmar right hand.  - Eczematous plaques on the forehead along the hairline.    Past Medical History:   There is no problem list on file for this patient.    No past medical history on file.    Allergies:  No Known Allergies    Medications:  Current Outpatient Medications   Medication Sig Dispense Refill     albuterol (PROAIR HFA/PROVENTIL HFA/VENTOLIN HFA) 108 (90 Base) MCG/ACT inhaler Inhale 1-2 puffs into the lungs       etonogestrel (NEXPLANON) 68 MG IMPL Inject 1 each Subcutaneous       ibuprofen (ADVIL/MOTRIN) 600 MG tablet Take 1 tablet (600 mg) by mouth every 6 hours as needed for moderate pain 30 tablet 0     No current facility-administered medications for this visit.       Social History:  The patient works as a phlebotomist. Patient has the following hobbies or non-occupational exposure: spending time with her son.    Family History:  No family history on file.    Previous Labs, Allergy Tests, Dermatopathology, Imagin24 Dr. Josiah Ann ([facility])  From [facility]:  History of a few different allergy concerns. Nose congestion, sneezing, itchy watery eyes. All year-round. Seasonally worse. Few years. Medications are not adequately helping. At times symptoms can be bad. Interested in allergy testing.    She currently uses nonlatex gloves at work. Has noticed itching, burning, rash and discoloration on both hands. For the past 1 year. Interested in testing for allergies for that.    Itching of the tip of the nose as well. Sometimes itching of the cheek and forehead as well. No obvious rash. This has been ongoing for more than 1 year.    Currently does have history of asthma. Per patient controlled. Using albuterol as needed.    History of local reaction with itching and redness after using wrap     Impressions:  Rash-both hands  Itching-nose, cheeks, forehead  Allergic  rhinitis  Allergic conjunctivitis  Inadequate response to treatment  Per patient suspected allergy to nonlatex gloves     Recommendations:  We talked about allergic and nonallergic causes  We talked about allergy skin testing versus blood testing  Check blood test for environmental allergies  We talked about allergy patch testing. Suggested referral to Dr. Francisco Powers for possible [place] panel patch testing and second opinion regarding skin rash with discoloration affecting both hands that is possibly after nonlatex glove exposure per patient  Suggested triamcinolone-0.1% ointment topically twice daily as needed  Flonase 2 sprays each nostril daily  Astelin 2 sprays each nostril twice daily  Pataday-0.2% eyedrops-1 drop in each eye once daily as needed  Zyrtec 10 mg by mouth once or twice daily  Other self-care techniques discussed  All questions answered  Follow-up in a month or sooner if problems     2/22/24 ED visit for Acute Bronchitis with Asthma ([facility])  From HPI:  Presents to the emergency department for evaluation of cough. The patient states that she has had a productive cough since Saturday 2/17. She states that she did have a fever of 101 F. She states that she went to urgent care where she tested negative for influenza. She states that was given prednisone and took this for two nights. She states that this did help with her cough. She states that today her cough is worse and is accompanied with wheezing. She also endorses rhinorrhea. She states that she has been using over the counter cough medications. She also notes that she tested negative for COVID-19 with an at home test kit. She also reports that she has a history of asthma and did use her inhaler this morning. She denies sore throat, and taking prescription medications for this. She denies further fevers or chest pain or pleuritic pain.    From A&P:  Vitally stable here although slightly tachycardic. She is overall well appearing. She  is not hypoxic or in respiratory distress. She had recent influenza testing which was negative. COVID PCR testing here is negative. Chest x-ray obtained shows no lobar consolidation or significant pleural effusion. Denies chest pain and I doubt ACS or PE in light of the above presentation and symptoms. Overall suspect acute bronchitis. We will extend her course of steroids and prescribed medications below. She request for her own nebulizer which was written for her. Recommend she follow-up with her primary care doctor regards to her ER visit today. She is comfortable this plan. Discussed return precautions for the emergency department. All questions as per discharge.    Referred By: Josiah Ann MD ([facility] allergy)  [id number] [place]  [address] [address]     Allergy Tests:  Ordered by Dr. Ann on 4/19/24 and Pending as of 4/22/24  ALTERNARIA ALTERNATA (M6) IGE   ASPERGILLUS FUMIGATUS IGE   BIRCH (T3) IGE  CAT DANDER (E1) IGE   CLADOSPORIUM HERBARUM IGE   COCKLEBUR IGE   COCKROACH (I6) IGE   COMMON RAGWEED (W1) IGE   D FARINAE (D2) IGE   D PTERONYSSINUS (D1) IGE   DOG DANDER (E5) IGE   ELM (T8) IGE   EPICOCCUM PURPUR IGE   MAPLE (BOX ELDER) (T1)IGE   ROUGH MARSH ELDER (W16) IGE  AGUSTÍN GRASS (G6) IGE   WHITE GOPAL (T15) IGE   WHITE OAK (T7) IGE     Order for Future Allergy Testing:    [x] Outpatient  [] Inpatient: [facility]..../ Bed ....       Skin Atopy (atopic dermatitis) [x] Yes   [] No .........see HPI  Contact allergies:   [x] Yes   [] No ..........see HPI  Hand eczema:   [] Yes   [x] No           Leading hand:   [x] R   [] L       [] Ambidextrous         Drug allergies:        [] Yes   [x] No  which?......    Urticaria/Angioedema  [] Yes   [x] No .........  Food Allergy:  [] Yes   [x] No  which?......  Pets :  [] Yes   [x] No  which?......none at home and no reaction to cats or dogs        [x]  Rhinitis   [] Conjunctivitis   [] Sinusitis   [] Polyposis   [] Otitis   [] Pharyngitis         []   Postnasal drip    []  none  Operations:   [] Tonsils   [] Septum   [] Sinus   [] Polyposis        [x] Asthma bronchiale   [] Coughing      []  none  Symptoms (mostly Rhinoconjunctivitis and Asthma) aggravated by:  Season   [] I   [] II   [] III   [x] IV   [x]V   [x]VI   []VII   []VIII   []IX   []X   []XI   []XII     [x] perennial   Day time      [] morning   [] noon      [] evening        [] night    [] whole day........  [x]  none  Location/changes    [] inside        [] outside   [] mountains    [] sea     [] others.............   [x]  none  Triggers, specific     [] animals     [] plants     [] dust              [] others ...........................    [x]  none  Triggers, others       [] work          [] psyche    [] sport            [] others .............................  [x]  none  Irritant                [] phys efforts [] smoke    [] heat/cold     [] odors  []others............... [x]  none    Order for PATCH TESTS  Reason for tests (suspected allergy): recurrent eczema on hands, face, and acutely on upper arms to forearms  Known previous allergies: none  Standardized panels  [x] Standard panel (40 tests)  [x] Preservatives & Antimicrobials (31 tests)  [x] Emulsifiers & Additives (25 tests)   [x] Perfumes/Flavours & Plants (25 tests)  [] Hairdresser panel (12 tests)  [x] Rubber Chemicals (22 tests)  [x] Plastics (26 tests)  [] Colorants/Dyes/Food additives (20 tests)  [] Metals (implants/dental) (24 tests)  [] Local anaesthetics/NSAIDs (13 tests)  [] Antibiotics & Antimycotics (14 tests)   [] Corticosteroids (15 tests)   [] Photopatch test (62 tests)   [] others: ...      [] Patient's own products: ...  DO NOT test if chemical or biological identity is unknown!   always ask from patient the product information and safety sheets (MSDS)       Order for PRICK TESTS (SEE RESULTS FROM DR. HOOKER'S BLOOD TESTS 1ST)    Reason for tests (suspected allergy): perennial RC with seasonal aggravation in late  spring/early summer  Known previous allergies: see above prior records    Standardized prick panels  [] Atopic panel (20 tests)  [] Pediatric Panel (12 tests)  [] Milk, Meat, Eggs, Grains (20 tests)   [] Dust, Epithelia, Feathers (10 tests)  [] Fish, Seafood, Shellfish (17 tests)  [] Nuts, Beans (14 tests)  [] Spice, Vegetable, Fruit (17 tests)  [] Pollen Panel = Tree, Grass, Weed (24 tests)  [] Others: ...      [] Patient's own products: ...  DO NOT test if chemical or biological identity is unknown!   always ask from patient the product information and safety sheets (MSDS)     Standardized intradermal tests  [] Penicillium notatum [] Aspergillus fumigatus [] House dust mites D.far & D. pteron  [] Cat    [] dog  [] Others: ...  [] Bee venom   [] Wasp venom  !!Specific protocol with dilutions!!       Order for Drug allergy tests (prick & Intradermal & patch tests)    [] Penicillin G  [] Ampicillin [] Cefazolin   [] Ceftriaxone   [] Ceftazidime  [] Bactrim    [] Others: ...  Order for ... as test date    [] Patient needs consultation with Allergy team (changes of tests may apply)  [x] Tests discussed with Allergy team (can have direct appointment for allergy tests)     ________________________________    Assessment & Plan:    ==> Final Diagnosis:     # Recurrent dermatitis on hands, face, and acute flares to coban on upper forearms  DDx: Atopic contact dermatitis (Occupational? To rubber gloves? Disinfectants/soaps?)  DDx: Irritant dermatitis with atopic dermatitis  * chronic illness with exacerbation, progression, side effects from treatment    # Suspicion for atopic predisposition with:  Perennial RC with some seasonal aggravation in late spring/early summer  Dry, hypersensitive skin (atopic dermatitis?)  * chronic illness with exacerbation, progression, side effects from treatment    These conclusions are made at the best of one's knowledge and belief based on the provided evidence such as patient's history and  allergy test results and they can change over time or can be incomplete because of missing information.    ==> Treatment Plan:    Until patch testing can be completed:  >> Start tacrolimus (Protopic) 0.1% ointment BID.  >> Reduce triamcinolone 0.1% ointment to using only for acute flares until resolved, and then on weekends in place of tacrolimus if needed.    Procedures Performed: None    Staff and Scribe:  Provider    Scribe Disclosure:   I, JAYME ARSHAD, am serving as a scribe to document services personally performed by Francisco Powers MD based on data collection and the provider's statements to me.     Staff Physician Comments:  I was present with the scribe who participated in the documentation of the note. I have verified the history and personally performed the physical exam and medical decision making. I agree with the assessment and plan as documented in the note. I have reviewed and if necessary amended the note.      Francisco Powers MD  Professor  Head of Dermato-Allergy Division  Department of Dermatology  [facility]    Follow-up in Derm-Allergy clinic for patch tests (and prick tests pending Dr. Ann's blood results from 4/19/24); stop all antihistamines 5-7 days prior    I spent a total of 30 minutes with Tobias EVANGELINA Haynes. This time was spent counseling the patient and/or coordinating care, explaining the allergy tests, performing allergy tests and assessing the clinical relevance.      Again, thank you for allowing me to participate in the care of your patient.        Sincerely,        Francisco Powers MD

## 2024-04-23 NOTE — NURSING NOTE
Chief Complaint   Patient presents with    Allergy Consult     Rash, consult for patch testing from [facility] (no referral in chart)     Jesica Brush RN

## 2024-07-16 ENCOUNTER — TELEPHONE (OUTPATIENT)
Dept: DERMATOLOGY | Facility: CLINIC | Age: 29
End: 2024-07-16
Payer: COMMERCIAL

## 2024-07-18 ENCOUNTER — TELEPHONE (OUTPATIENT)
Dept: ALLERGY | Facility: CLINIC | Age: 29
End: 2024-07-18
Payer: COMMERCIAL

## 2024-07-18 NOTE — TELEPHONE ENCOUNTER
Standardized panels  [x] Standard panel (40 tests)  [x] Preservatives & Antimicrobials (31 tests)  [x] Emulsifiers & Additives (25 tests)   [x] Perfumes/Flavours & Plants (25 tests)  [] Hairdresser panel (12 tests)  [x] Rubber Chemicals (22 tests)  [x] Plastics (26 tests)  [] Colorants/Dyes/Food additives (20 tests)  [] Metals (implants/dental) (24 tests)  [] Local anaesthetics/NSAIDs (13 tests)  [] Antibiotics & Antimycotics (14 tests)   [] Corticosteroids (15 tests)   [] Photopatch test (62 tests)   [] others: ...                         [] Patient's own products: ...  DO NOT test if chemical or biological identity is unknown!   always ask from patient the product information and safety sheets (MSDS)       STANDARD Series                                          # Substance 2 days 4 days remarks     1 Ronnie Mix [C] - -       2 Colophony - -       3  2-Mercaptobenzothiazole  - -       4 Methylisothiazolinone - -       5 Carba Mix - -       6 Thiuram Mix [A] - -       7 Bisphenol A Epoxy Resin - -       8 R-Bisg-Brbavojcwmo-Formaldehyde Resin - -       9 Mercapto Mix [A] - -       10 Black Rubber Mix- PPD [B] - -       11 Potassium Dichromate  -  -       12 Balsam of [place] (Myroxylon Pereirae Resin) - -       13 Nickel Sulphate Hexahydrate - -       14 Mixed Dialkyl Thiourea - -       15 Paraben Mix [B] - -       16 Methyldibromo Glutaronitrile - -       17 Fragrance Mix 8% - -       18 2-Bromo-2-Nitropropane-1,3-Diol (Bronopol) CT - -       19 Lyral - -       20 Tixocortol-21- Pivalate CT - -       21 Diazolidinyl urea (Germall II) - -        22 Methyl Methacrylate - -       23 Cobalt (II) Chloride Hexahydrate - -       24 Fragrance Mix II  - -       25 Compositae Mix - -       26 Benzoyl Peroxide - -       27 Bacitracin - -       28 Formaldehyde - -       29 Methylchloroisothiazolinone / Methylisothiazolinone - -       30 Corticosteroid Mix CT - -       31 Sodium Lauryl Sulfate - -       32 Lanolin Alcohol - -        33 Turpentine - -       34 Cetylstearylalcohol - -       35 Chlorhexidine Dicluconate - -       36 Budesonide - -       37 Imidazolidinyl Urea  - -       38 Ethyl-2 Cyanoacrylate - -       39 Quaternium 15 (Dowicil 200) - -       40 Decyl Glucoside - -      PRESERVATIVES & ANTIMICROBIALS        # Substance 2 days 4 days remarks   41 1  1,2-Benzisothiazoline-3-One, Sodium Salt - -     2  1,3,5-Jesus Manuel (2-Hydroxyethyl) - Hexahydrotriazine (Grotan BK) - -     3 1-Cbwetniemdxun-2-Nitro-1, 3-Propanediol - -     4  3, 4, 4' - Triclocarban - -    45 5 4 - Chloro - 3 - Cresol - -     6 4 - Chloro - 4 - Xylenol (PCMX) - -     7 7-Ethylbicyclooxazolidine (Bioban [id number]) - -     8 Benzalkonium Chloride CT - -     9 Benzyl Alcohol - -    50 10 Cetalkonium Chloride - -     11 Cetylpyrimidine Chloride  - -     12 Chloroacetamide - -     13 DMDM Hydantoin - -     14 Glutaraldehyde - -    55 15 Triclosan - -     16 Glyoxal Trimeric Dihydrate - -     17 Iodopropynyl Butylcarbamate - -     18 Octylisothiazoline - -     19 Bithionol CT - -    60 20 Bioban P 1487 (Nitrobutyl) Morpholine/(Ethylnitro-Trimethylene) Dimorpholine - -     21 Phenoxyethanol - -     22 Phenyl Salicylate - -     23 Povidone Iodine - -     24 Sodium Benzoate - -    65 25 Sodium Disulfite - -     26 Sorbic Acid - -     27 Thimerosal - -     28 Melamine Formaldehyde Resin - -     29 Ethylenediamine Dihydrochloride - -      Parabens      70 30 Butyl-P-Hydroxybenzoate - -     31 Ethyl-P-Hydroxybenzoate - -     32 Methyl-P-Hydroxybenzoate - -    73 33 Propyl-P-Hydroxybenzoate - -     EMULSIFIERS & ADDITIVES       # Substance 2 days 4 days remarks   74 1 Polyethylene Glycol-400 - -    75 2 Cocamidopropyl Betaine - -     3 Amerchol L101 - -     4 Propylene Glycol - -     5 Triethanolamine - -     6 Sorbitane Sesquiolate CT - -    80 7 Isopropylmyristate - -     8 Polysorbate 80 CT - -     9 Amidoamine   (Stearamidopropyl Dimethylamine) - -     10 Oleamidopropyl  Dimethylamine - -     11 Lauryl Glucoside - -    85 12 Coconut Diethanolamide  - -     13 2-Hydroxy-4-Methoxy Benzophenone (Oxybenzone) - -     14 Benzophenone-4 (Sulisobenzon) - -     15 Propolis - -     16 Dexpanthenol - -    90 17 Abitol - -     18 Tert-Butylhydroquinone - -     19 Benzyl Salicylate - -     20 Dimethylaminopropylamin (DMPA) - -     21 Zinc Pyrithione (Zinc Omadine)  - -    95 22 Jesus Manuel(Hydroxymethyl) Nitromethane  - -      Antioxidant       23 Dodecyl Gallate - -     24 Butylhydroxyanisole (BHA) - -     25 Butylhydroxytoluene (BHT) - -     26 Di-Alpha-Tocopherol (Vit E) - -    100 27 Propyl Gallate - -     PERFUMES, FLAVORS & PLANTS        # Substance 2 days 4 days remarks   101 1 Benzyl Cinnamate - -     2 Di-Limonene (Dipentene) - -     3 Cananga Odorata (Corey Nicole) (I) - -     4 Lichen Acid Mix - -    105 5 Mentha Piperita Oil (Peppermint Oil) - -     6 Sesquiterpenelactone mix - -     7 Tea Tree Oil, Oxidized - -     8 Wood Tar Mix - -     9 Abietic Acid - -    110 10 Lavendula Angustifolia Oil (Lavender Oil) - -     11 Fragrance mix II CT * 14% - -      Fragrance Mix I       12 Oakmoss Absolute - -     13 Eugenol - -     14 Geraniol - -    115 15 Hydroxycitronellal - -     16 Isoeugenol - -     17 Cinnamic Aldehyde - -     18 Cinnamic Alcohol  - -      Fragrance mix II       19 Citronellol - -    120 20 Alpha-Hexylcinnamic Aldehyde    - -     21 Citral - -     22 Farnesol - -    123 23 Coumarin - -    Hexylcinnamic aldehyde, Coumarin, Farnesol, Hydroxyisohexy3-cyclohexene carboxaldehyde, citral, citrolellol   RUBBER CHEMICALS        # Substance 2 days 4 days remarks     Carbamate      124 1 Zinc Bis ( Diethyldithio carbamate ) (ZDEC) - -    125 2 Zinc Bis (Dibutyldithiocarbamate) - -     3 1,3-Diphenylguanidine (DPG) - -      Thiourea       4 Dibutylthiourea - -     5 Diphenyltiourea - -     6 Thiourea - -      Mercapto chemicals      130 7 Morpholinyl Mercaptobenzothiazole - -     8  Z-Nrthgwsagp-3-Benzothiazyl-Sulfenamide - -     9 Dibenzothiazyl Disulfide - -      Thiuram chemicals       10 Dipentamethylenethiuram Disulfide - -     11 Tetraethylthiuram Disulfide (Disulfiram) - -    135 12 Tetramethylthiuram Disulfide - -     13 Tetramethyl Thiuram Monosulfide (TMTM) - -      4-Phenylenediamine derivatives       14 N-Isopropyl-N'-Phenyl-P-Phenylenediamine (IPPD) - -     15 Uqpzescj-V-Nkfyoulmrbjzwmgi (DPPD) - -      Various Rubber Additives       16 Hydroquinone Monobenzylether  - -    140 17 Hexamethylenetetramine - -     18 4,4'-Dihydroxybiphenyl - -     19 Cyclohexylthiophtalimide - -    143 20 N-Phenyl-B-Naphthylamine - -     PLASTICS (Acrylates/Epoxy Systems)       # Substance 2 days 4 days remarks     Acrylates - -    144 1 2-Hydroxyethyl Methacrylate (HEMA) - -    145 2 1,4-Butandioldimethacrylate (BUDMA) - -     3  2-Ethylhexyl Acrylate - -     4 Bisphenol-A-Dimethacrylate  - -     5 Diurethane-Dimethacrylate - -     6 Ethyleneglycoldimethacrylate (EGDMA) - -    150 7 Pentaerythritoltriacrylate (ZAKI) - -     8 Triethylene Glycol Dimethacrylate (TEGDMA) - -      Synthetic material/additives        9 N-Yikm-Isxumnrfpbp - -     10 Tricresyl Phosphate - -     11 5-Ooaa-Mdzfbvrzhcziu - -    155 12 Dioctyl phtalate(DEHP,DOP) / (Dimethylhexylphthalate)  - -     13 Dibutylphthalate - -     14 Dimethylphthalate - -     15 Toluene-2,4-Diisocyanate - -     16 Diphenylmethane-4,4''-Diisocyanate - -      EPOXY RESIN SYSTEMS        Reactive Solvents - -    160 17 Cresyl Glycidyl Ether - -     18 Butyl Glycidyl Ether - -     19 Phenyl Glycidyl Ether - -     20 1,4-Butanediol Diglycidyl Ether - -     21 1,6-Hexanediole Diglycidyl Ether - -      Hardener / Accelerator - -    165 22 Triethylenetetramine - -     23 Diethylenetriamine - -     24 Isophorone Diamine (IPD) - -     25 N,N-Dimethyl-P-Toluidine - -    169 26 Isobornyl Acrylate - -       Results of patch tests:                          Interpretation:  - Negative                    A    = Allergic      (+) Erythema    TI   = Toxic/irritant   + E + Infiltration    RaP = Relevance at Present     ++ E/I + Papulovesicle   Rpr  = Relevance Previously     +++ E/I/P + Blister     nR   = No Relevance

## 2024-07-22 ENCOUNTER — OFFICE VISIT (OUTPATIENT)
Dept: ALLERGY | Facility: CLINIC | Age: 29
End: 2024-07-22
Payer: COMMERCIAL

## 2024-07-22 DIAGNOSIS — Z88.9 ATOPY: ICD-10-CM

## 2024-07-22 DIAGNOSIS — J30.89 SEASONAL AND PERENNIAL ALLERGIC RHINOCONJUNCTIVITIS: ICD-10-CM

## 2024-07-22 DIAGNOSIS — L23.5 ALLERGIC DERMATITIS DUE TO OTHER CHEMICAL PRODUCT: Primary | ICD-10-CM

## 2024-07-22 DIAGNOSIS — L23.9 OCCUPATIONAL ALLERGIC CONTACT DERMATITIS: ICD-10-CM

## 2024-07-22 DIAGNOSIS — J30.2 SEASONAL AND PERENNIAL ALLERGIC RHINOCONJUNCTIVITIS: ICD-10-CM

## 2024-07-22 DIAGNOSIS — H10.10 SEASONAL AND PERENNIAL ALLERGIC RHINOCONJUNCTIVITIS: ICD-10-CM

## 2024-07-22 DIAGNOSIS — L30.9 HAND DERMATITIS: ICD-10-CM

## 2024-07-22 DIAGNOSIS — L20.89 OTHER ATOPIC DERMATITIS: ICD-10-CM

## 2024-07-22 PROCEDURE — 95044 PATCH/APPLICATION TESTS: CPT | Mod: 59 | Performed by: DERMATOLOGY

## 2024-07-22 PROCEDURE — 95004 PERQ TESTS W/ALRGNC XTRCS: CPT | Performed by: DERMATOLOGY

## 2024-07-22 PROCEDURE — 95024 IQ TESTS W/ALLERGENIC XTRCS: CPT | Performed by: DERMATOLOGY

## 2024-07-22 NOTE — NURSING NOTE
Chief Complaint   Patient presents with    Allergy Testing Followup     Patch testing day 1     Jesica Brush RN

## 2024-07-22 NOTE — PROGRESS NOTES
[facility] Dermato-allergology Note  Office visit  Encounter Date: Jul 22, 2024  ____________________________________________    CC: Allergy Testing Followup (Patch testing day 1)      HPI:  (Jul 22, 2024)  Ms. Tobias Haynes is a(n) 28 year old female who presents today as a return patient for allergy tests as planned  - Follow-up in Derm-Allergy clinic for patch tests (and prick tests pending Dr. Ann's blood results from 4/19/24); stop all antihistamines 5-7 days prior  - 4/19/24 Results from specific IgEs ordered by Dr. Ann:  The following were negative:  Alternaria alternata  Aspergillus fumigatus   Birch  Cat  Cladosporium herbarum   Cocklebur  Cockroach  Common Ragweed  Dermatophagoides farinae   Dermatophagoides pteronyssinus   Dog  Elm  Epicoccum purpur   Maple ([place])  Rough Marsh Elder  Shade Grass  White Oak  White Gabriel  - Otherwise feeling well in usual state of health    Physical Exam:  General: In no acute distress, well-developed, well-nourished  Eyes: no conjunctivitis  ENT: no signs of rhinitis   Pulmonary: no wheezing or coughing  Skin: Focused examination of the skin on test sites was performed = see test results below  No active eczematous skin lesions on tests sites, particularly back    Earlier History and Allergy Exams:  (Apr 23, 2024)  New patient for allergy consultation  - Referred by Dr. Josiah Ann for North American panel patch test for rash. 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  Recently reacted to coban on her arm.  At home seems to get rashes on face- of note rubbing alcohol helps the rash.   - Recently had Nexplanon replaced ~1.5 weeks ago (see 4/11/24 note by Lashawn Christianson, NP with [facility])  - Wrap that was used on arm caused itchy rash; she has never had a wrap on as long as it was one  - Iodine was used by OB/GYN's staff to disinfect the area  - Wears latex-free gloves at her job as a  phlebotomist  - She, mom, and sister all have itchy nose  - For her, will spread out from her nose to her face; lasts for a couple hours  - Has had hay fever and spontaneous allergies since high school  - [place] is a trigger in late spring  - Asthma from childhood to present  - Uses albuterol infrequently  - Dyes her hair, but last done     Skin: Focused examination of the face, hairline, arms, hands was performed.  - On the left upper arm immediately above the elbow, there is an eczematous reaction with slight hyperpigmentation, xerosis, and papular vesicles present.   - Hyperpigmentation on dorsal lateral and palmar right hand.  - Eczematous plaques on the forehead along the hairline.    Past Medical History:   There is no problem list on file for this patient.    No past medical history on file.    Allergies:  No Known Allergies    Medications:  Current Outpatient Medications   Medication Sig Dispense Refill    albuterol (PROAIR HFA/PROVENTIL HFA/VENTOLIN HFA) 108 (90 Base) MCG/ACT inhaler Inhale 1-2 puffs into the lungs      etonogestrel (NEXPLANON) 68 MG IMPL Inject 1 each Subcutaneous      ibuprofen (ADVIL/MOTRIN) 600 MG tablet Take 1 tablet (600 mg) by mouth every 6 hours as needed for moderate pain 30 tablet 0     No current facility-administered medications for this visit.     Social History:  The patient works as a phlebotomist. Patient has the following hobbies or non-occupational exposure: spending time with her son.    Family History:  No family history on file.    Previous Labs, Allergy Tests, Dermatopathology, Imagin24 Dr. Josiah Ann ([facility])  From [facility]:  History of a few different allergy concerns. Nose congestion, sneezing, itchy watery eyes. All year-round. Seasonally worse. Few years. Medications are not adequately helping. At times symptoms can be bad. Interested in allergy testing.    She currently uses nonlatex gloves at work. Has noticed itching, burning, rash and  discoloration on both hands. For the past 1 year. Interested in testing for allergies for that.    Itching of the tip of the nose as well. Sometimes itching of the cheek and forehead as well. No obvious rash. This has been ongoing for more than 1 year.    Currently does have history of asthma. Per patient controlled. Using albuterol as needed.    History of local reaction with itching and redness after using wrap     Impressions:  Rash-both hands  Itching-nose, cheeks, forehead  Allergic rhinitis  Allergic conjunctivitis  Inadequate response to treatment  Per patient suspected allergy to nonlatex gloves     Recommendations:  We talked about allergic and nonallergic causes  We talked about allergy skin testing versus blood testing  Check blood test for environmental allergies  We talked about allergy patch testing. Suggested referral to Dr. Francisco Powres for possible North American panel patch testing and second opinion regarding skin rash with discoloration affecting both hands that is possibly after nonlatex glove exposure per patient  Suggested triamcinolone-0.1% ointment topically twice daily as needed  Flonase 2 sprays each nostril daily  Astelin 2 sprays each nostril twice daily  Pataday-0.2% eyedrops-1 drop in each eye once daily as needed  Zyrtec 10 mg by mouth once or twice daily  Other self-care techniques discussed  All questions answered  Follow-up in a month or sooner if problems     2/22/24 ED visit for Acute Bronchitis with Asthma ([facility])  From HPI:  Presents to the emergency department for evaluation of cough. The patient states that she has had a productive cough since Saturday 2/17. She states that she did have a fever of 101 F. She states that she went to urgent care where she tested negative for influenza. She states that was given prednisone and took this for two nights. She states that this did help with her cough. She states that today her cough is worse and is accompanied with wheezing. She  also endorses rhinorrhea. She states that she has been using over the counter cough medications. She also notes that she tested negative for COVID-19 with an at home test kit. She also reports that she has a history of asthma and did use her inhaler this morning. She denies sore throat, and taking prescription medications for this. She denies further fevers or chest pain or pleuritic pain.    From A&P:  Vitally stable here although slightly tachycardic. She is overall well appearing. She is not hypoxic or in respiratory distress. She had recent influenza testing which was negative. COVID PCR testing here is negative. Chest x-ray obtained shows no lobar consolidation or significant pleural effusion. Denies chest pain and I doubt ACS or PE in light of the above presentation and symptoms. Overall suspect acute bronchitis. We will extend her course of steroids and prescribed medications below. She request for her own nebulizer which was written for her. Recommend she follow-up with her primary care doctor regards to her ER visit today. She is comfortable this plan. Discussed return precautions for the emergency department. All questions as per discharge.    Referred By: Josiah Ann MD ([facility])  [address]  [address]  [place] [address]     Allergy Tests:  Ordered by Dr. Ann on 4/19/24 and Pending as of 4/22/24 - see above in Prior Exams section for 7/22/24 note  ALTERNARIA ALTERNATA (M6) IGE   ASPERGILLUS FUMIGATUS IGE   BIRCH (T3) IGE  CAT DANDER (E1) IGE   CLADOSPORIUM HERBARUM IGE   COCKLEBUR IGE   COCKROACH (I6) IGE   COMMON RAGWEED (W1) IGE   D FARINAE (D2) IGE   D PTERONYSSINUS (D1) IGE   DOG DANDER (E5) IGE   ELM (T8) IGE   EPICOCCUM PURPUR IGE   MAPLE (BOX ELDER) (T1)IGE   ROUGH MARSH ELDER (W16) IGE  AGUSTÍN GRASS (G6) IGE   WHITE GOPAL (T15) IGE   WHITE OAK (T7) IGE     Order for Future Allergy Testing:    [x] Outpatient  [] Inpatient: [facility]..../ Bed ....       Skin Atopy (atopic  dermatitis) [x] Yes   [] No .........see HPI  Contact allergies:   [x] Yes   [] No ..........see HPI  Hand eczema:   [] Yes   [x] No           Leading hand:   [x] R   [] L       [] Ambidextrous         Drug allergies:        [] Yes   [x] No  which?......    Urticaria/Angioedema  [] Yes   [x] No .........  Food Allergy:  [] Yes   [x] No  which?......  Pets :  [] Yes   [x] No  which?......none at home and no reaction to cats or dogs        [x]  Rhinitis   [] Conjunctivitis   [] Sinusitis   [] Polyposis   [] Otitis   [] Pharyngitis         []  Postnasal drip    []  none  Operations:   [] Tonsils   [] Septum   [] Sinus   [] Polyposis        [x] Asthma bronchiale   [] Coughing      []  none  Symptoms (mostly Rhinoconjunctivitis and Asthma) aggravated by:  Season   [] I   [] II   [] III   [x] IV   [x]V   [x]VI   []VII   []VIII   []IX   []X   []XI   []XII     [x] perennial   Day time      [] morning   [] noon      [] evening        [] night    [] whole day........  [x]  none  Location/changes    [] inside        [] outside   [] mountains    [] sea     [] others.............   [x]  none  Triggers, specific     [] animals     [] plants     [] dust              [] others ...........................    [x]  none  Triggers, others       [] work          [] psyche    [] sport            [] others .............................  [x]  none  Irritant                [] phys efforts [] smoke    [] heat/cold     [] odors  []others............... [x]  none    Order for PATCH TESTS  Reason for tests (suspected allergy): recurrent eczema on hands, face, and acutely on upper arms to forearms  Known previous allergies: none  Standardized panels  [x] Standard panel (40 tests)  [x] Preservatives & Antimicrobials (31 tests)  [x] Emulsifiers & Additives (25 tests)   [x] Perfumes/Flavours & Plants (25 tests)  [] Hairdresser panel (12 tests)  [x] Rubber Chemicals (22 tests)  [x] Plastics (26 tests)  [] Colorants/Dyes/Food additives (20 tests)  []  Metals (implants/dental) (24 tests)  [] Local anaesthetics/NSAIDs (13 tests)  [] Antibiotics & Antimycotics (14 tests)   [] Corticosteroids (15 tests)   [] Photopatch test (62 tests)   [] others: ...      [] Patient's own products: ...  DO NOT test if chemical or biological identity is unknown!   always ask from patient the product information and safety sheets (MSDS)       Order for PRICK TESTS    Reason for tests (suspected allergy): perennial RC with seasonal aggravation in late spring/early summer  Known previous allergies: see above prior records - specific IgE in 4/2024 blood tests all negative    Standardized prick panels  [x] Atopic panel (20 tests)  [] Pediatric Panel (12 tests)  [] Milk, Meat, Eggs, Grains (20 tests)   [] Dust, Epithelia, Feathers (10 tests)  [] Fish, Seafood, Shellfish (17 tests)  [] Nuts, Beans (14 tests)  [] Spice, Vegetable, Fruit (17 tests)  [] Pollen Panel = Tree, Grass, Weed (24 tests)  [] Others: ...      [] Patient's own products: ...  DO NOT test if chemical or biological identity is unknown!   always ask from patient the product information and safety sheets (MSDS)     Standardized intradermal tests  [x] Penicillium notatum [x] Aspergillus fumigatus [x] House dust mites D.far & D. pteron  [] Cat    [] dog  [x] Others: Alternaria  [] Bee venom   [] Wasp venom  !!Specific protocol with dilutions!!       Order for Drug allergy tests (prick & Intradermal & patch tests)    [] Penicillin G  [] Ampicillin [] Cefazolin   [] Ceftriaxone   [] Ceftazidime  [] Bactrim    [] Others: ...  Order for ... as test date    [] Patient needs consultation with Allergy team (changes of tests may apply)  [x] Tests discussed with Allergy team (can have direct appointment for allergy tests)     RESULTS & EVALUATION of PATCH TESTS    Jul 22, 2024 application of patch tests:    Patch test readings after     [x] 2 days, [] 3 days [x] 4 days, [] 5 days,  Other duration: ...    STANDARD Series                                           # Substance 2 days 4 days remarks     1 Ronnie Mix [C] - -       2 Colophony - -       3  2-Mercaptobenzothiazole  - -       4 Methylisothiazolinone - -       5 Carba Mix - -       6 Thiuram Mix [A] - -       7 Bisphenol A Epoxy Resin - -       8 A-Xkav-Lacokfabgaw-Formaldehyde Resin - -       9 Mercapto Mix [A] - -       10 Black Rubber Mix- PPD [B] - -       11 Potassium Dichromate  -  -       12 Balsam of [place] (Myroxylon Pereirae Resin) - -       13 Nickel Sulphate Hexahydrate - -       14 Mixed Dialkyl Thiourea - -       15 Paraben Mix [B] - -       16 Methyldibromo Glutaronitrile - -       17 Fragrance Mix 8% - -       18 2-Bromo-2-Nitropropane-1,3-Diol (Bronopol) CT - -       19 Lyral - -       20 Tixocortol-21- Pivalate CT - -       21 Diazolidinyl urea (Germall II) - -        22 Methyl Methacrylate - -       23 Cobalt (II) Chloride Hexahydrate - -       24 Fragrance Mix II  - -       25 Compositae Mix - -       26 Benzoyl Peroxide - -       27 Bacitracin - -       28 Formaldehyde - -       29 Methylchloroisothiazolinone / Methylisothiazolinone - -       30 Corticosteroid Mix CT - -       31 Sodium Lauryl Sulfate - -       32 Lanolin Alcohol - -       33 Turpentine - -       34 Cetylstearylalcohol - -       35 Chlorhexidine Dicluconate - -       36 Budesonide - -       37 Imidazolidinyl Urea  - -       38 Ethyl-2 Cyanoacrylate - -       39 Quaternium 15 (Dowicil 200) - -       40 Decyl Glucoside - -     PRESERVATIVES & ANTIMICROBIALS        # Substance 2 days 4 days remarks   41 1  1,2-Benzisothiazoline-3-One, Sodium Salt - -     2  1,3,5-Jesus Manuel (2-Hydroxyethyl) - Hexahydrotriazine (Grotan BK) - -     3 5-Kdkoytxyqndvd-8-Nitro-1, 3-Propanediol NA NA     4  3, 4, 4' - Triclocarban - -    45 5 4 - Chloro - 3 - Cresol - -     6 4 - Chloro - 4 - Xylenol (PCMX) - -     7 7-Ethylbicyclooxazolidine (Bioban [id number]) - -     8 Benzalkonium Chloride CT - -     9 Benzyl Alcohol - -    50 10  Cetalkonium Chloride - -     11 Cetylpyrimidine Chloride  - -     12 Chloroacetamide - -     13 DMDM Hydantoin - -     14 Glutaraldehyde - -    55 15 Triclosan - -     16 Glyoxal Trimeric Dihydrate - -     17 Iodopropynyl Butylcarbamate - -     18 Octylisothiazoline - -     19 Bithionol CT NA NA    60 20 Bioban P 1487 (Nitrobutyl) Morpholine/(Ethylnitro-Trimethylene) Dimorpholine - -     21 Phenoxyethanol - -     22 Phenyl Salicylate - -     23 Povidone Iodine - -     24 Sodium Benzoate - -    65 25 Sodium Disulfite - -     26 Sorbic Acid - -     27 Thimerosal - -     28 Melamine Formaldehyde Resin - -     29 Ethylenediamine Dihydrochloride - -      Parabens      70 30 Butyl-P-Hydroxybenzoate - -     31 Ethyl-P-Hydroxybenzoate - -     32 Methyl-P-Hydroxybenzoate - -    73 33 Propyl-P-Hydroxybenzoate - -    EMULSIFIERS & ADDITIVES       # Substance 2 days 4 days remarks   74 1 Polyethylene Glycol-400 - -    75 2 Cocamidopropyl Betaine - -     3 Amerchol L101 - -     4 Propylene Glycol - -     5 Triethanolamine - -     6 Sorbitane Sesquiolate CT - -    80 7 Isopropylmyristate - -     8 Polysorbate 80 CT - -     9 Amidoamine   (Stearamidopropyl Dimethylamine) - -     10 Oleamidopropyl Dimethylamine - -     11 Lauryl Glucoside - -    85 12 Coconut Diethanolamide  - -     13 2-Hydroxy-4-Methoxy Benzophenone (Oxybenzone) - -     14 Benzophenone-4 (Sulisobenzon) - -     15 Propolis - -     16 Dexpanthenol - -    90 17 Abitol - -     18 Tert-Butylhydroquinone - -     19 Benzyl Salicylate - -     20 Dimethylaminopropylamin (DMPA) - -     21 Zinc Pyrithione (Zinc Omadine)  - -    95 22 Jesus Manuel(Hydroxymethyl) Nitromethane  - -      Antioxidant       23 Dodecyl Gallate - -     24 Butylhydroxyanisole (BHA) - -     25 Butylhydroxytoluene (BHT) - -     26 Di-Alpha-Tocopherol (Vit E) - -    100 27 Propyl Gallate - -    PERFUMES, FLAVORS & PLANTS        # Substance 2 days 4 days remarks   101 1 Benzyl Cinnamate - -     2 Di-Limonene  (Dipentene) - -     3 Cananga Odorata (Corey Nicole) (I) - -     4 Lichen Acid Mix - -    105 5 Mentha Piperita Oil (Peppermint Oil) - -     6 Sesquiterpenelactone mix - -     7 Tea Tree Oil, Oxidized - -     8 Wood Tar Mix - -     9 Abietic Acid - -    110 10 Lavendula Angustifolia Oil (Lavender Oil) - -     11 Fragrance mix II CT * 14% - -      Fragrance Mix I       12 [place]oss Absolute - -     13 Eugenol - -     14 Geraniol - -    115 15 Hydroxycitronellal - -     16 Isoeugenol - -     17 Cinnamic Aldehyde - -     18 Cinnamic Alcohol  - -      Fragrance mix II       19 Citronellol - -    120 20 Alpha-Hexylcinnamic Aldehyde    - -     21 Citral - -     22 Farnesol - -    123 23 Coumarin - -    Hexylcinnamic aldehyde, Coumarin, Farnesol, Hydroxyisohexy3-cyclohexene carboxaldehyde, citral, citrolellol  RUBBER CHEMICALS        # Substance 2 days 4 days remarks     Carbamate      124 1 Zinc Bis ( Diethyldithio carbamate ) (ZDEC) - -    125 2 Zinc Bis (Dibutyldithiocarbamate) - -     3 1,3-Diphenylguanidine (DPG) - -      Thiourea       4 Dibutylthiourea - -     5 Diphenyltiourea - -     6 Thiourea - -      Mercapto chemicals      130 7 Morpholinyl Mercaptobenzothiazole - -     8 M-Kzrrsblqdo-8-Benzothiazyl-Sulfenamide - -     9 Dibenzothiazyl Disulfide - -      Thiuram chemicals       10 Dipentamethylenethiuram Disulfide - -     11 Tetraethylthiuram Disulfide (Disulfiram) - -    135 12 Tetramethylthiuram Disulfide - -     13 Tetramethyl Thiuram Monosulfide (TMTM) - -      4-Phenylenediamine derivatives       14 N-Isopropyl-N'-Phenyl-P-Phenylenediamine (IPPD) - -     15 Gjhlljqt-V-Xphhwhmvzggdzlnh (DPPD) - -      Various Rubber Additives       16 Hydroquinone Monobenzylether  - -    140 17 Hexamethylenetetramine - -     18 4,4'-Dihydroxybiphenyl - -     19 Cyclohexylthiophtalimide - -    143 20 N-Phenyl-B-Naphthylamine - -    PLASTICS (Acrylates/Epoxy Systems)       # Substance 2 days 4 days remarks     Acrylates - -     144 1 2-Hydroxyethyl Methacrylate (HEMA) - -    145 2 1,4-Butandioldimethacrylate (BUDMA) - -     3  2-Ethylhexyl Acrylate - -     4 Bisphenol-A-Dimethacrylate  - -     5 Diurethane-Dimethacrylate - -     6 Ethyleneglycoldimethacrylate (EGDMA) - -    150 7 Pentaerythritoltriacrylate (ZAKI) - -     8 Triethylene Glycol Dimethacrylate (TEGDMA) - -      Synthetic material/additives        9 U-Uojj-Piavvicptgk - -     10 Tricresyl Phosphate - -     11 5-Nirg-Wlhfnzxmtansj - -    155 12 Dioctyl phtalate(DEHP,DOP) / (Dimethylhexylphthalate)  - -     13 Dibutylphthalate - -     14 Dimethylphthalate - -     15 Toluene-2,4-Diisocyanate - -     16 Diphenylmethane-4,4''-Diisocyanate NA NA      EPOXY RESIN SYSTEMS        Reactive Solvents - -    160 17 Cresyl Glycidyl Ether - -     18 Butyl Glycidyl Ether - -     19 Phenyl Glycidyl Ether - -     20 1,4-Butanediol Diglycidyl Ether - -     21 1,6-Hexanediole Diglycidyl Ether - -      Hardener / Accelerator - -    165 22 Triethylenetetramine - -     23 Diethylenetriamine - -     24 Isophorone Diamine (IPD) - -     25 N,N-Dimethyl-P-Toluidine - -    169 26 Isobornyl Acrylate - -       Results of patch tests:                         Interpretation:  - Negative                    A    = Allergic      (+) Erythema    TI   = Toxic/irritant   + E + Infiltration    RaP = Relevance at Present     ++ E/I + Papulovesicle   Rpr  = Relevance Previously     +++ E/I/P + Blister     nR   = No Relevance    Atopy Screen (Placed Jul 22, 2024)  No Substance Readings (15 min) Evaluation   POS Histamine 1mg/ml ++    NEG NaCl 0.9% -      No Substance Readings (15 min) Evaluation   1 Alternaria alternata (tenuis)  -    2 Cladosporium herbarum -    3 Aspergillus fumigatus -    4 Penicillium notatum -    5 Dermatophagoides pteronyssinus +    6 Dermatophagoides farinae ?    7 Dog epithelium (canis spp) -    8 Cat hair (michelle catus) +    9 Cockroach   (Blatella americana & germanica) -    10 Grass mix  midwest   (Cherelle, Orchard, Redtop, Shade) -    11 Stephen grass (sorghum halepense) -    12 Weed mix   (common Cocklebur, Lamb s quarters, rough redroot Pigweed, Dock/Sorrel) -    13 Mug wort (artemisia vulgare) -    14 Ragweed giant/short (ambrosia spp) -    15 White birch (Betula papyrifera) -    16 Tree mix 1 (Pecan, Maple BHR, Oak RVW, american [place], black Franklin) -    17 Red cedar (juniperus virginia) -    18 Tree mix 2   (white Gabriel, river/red Birch, black [place], common [place], american Elm) -    19 Box elder/Maple mix (acer spp) -    20 [place] shagbark (carya ovata) -    Conclusion: Due to questionable/borderline reactions, will perform IDT.    Intradermal Testing (Placed Jul 22, 2024)  No Substance Conc.  Reading (15min)  immediate Papule [mm] / Erythema [mm] Reading   (... days)  delayed Papule [mm] / Erythema [mm] Remarks   DF Standard Dust Mite - D. Farinae 1:10 ? 5/5  -    DP Standard Dust Mite - D. Pteronyssinus 1:10 - -  -    A Aspergillus fumigatus  1:10 - -  -    P Penicillium notatum 1:10 - -  -     Alternaria alternaria 1:10 - -  -    Conclusion: Questionable reaction to dust mite Dermatophagoides farinae, but otherwise no signs for immediate-type reaction. Over the next 2 and 4 days, we will check for delayed-type reactions..      [] No relevant allergic reaction observed      [] Allergic reaction diagnosed against following allergens:      Interpretation/Remarks:   See later    [] Patient information given  [] ACDS CAMP information (# ....) to following compounds:  Standard Search:   Advanced Search:   [] General information to following compounds:       Assessment & Plan:    ==> Final Diagnosis:     # Recurrent dermatitis on hands, face, and acute flares to coban on upper forearms  DDx: Atopic contact dermatitis (Occupational? To rubber gloves? Disinfectants/soaps?)  DDx: Irritant dermatitis with atopic dermatitis  * chronic illness with exacerbation, progression, side effects from  treatment    # Suspicion for atopic predisposition with:  Perennial RC with some seasonal aggravation in late spring/early summer = all blood tests negatives!  Dry, hypersensitive skin (atopic dermatitis?)  * chronic illness with exacerbation, progression, side effects from treatment    These conclusions are made at the best of one's knowledge and belief based on the provided evidence such as patient's history and allergy test results and they can change over time or can be incomplete because of missing information.    ==> Treatment Plan:    >> Until patch testing can be completed:  - Continue tacrolimus (Protopic) 0.1% ointment BID.  - Only use triamcinolone 0.1% ointment for acute flares until resolved, and then on weekends in place of tacrolimus if needed.     Procedures Performed: Allergy tests, including prick, intradermal , and patch tests     Staff Involved: Provider, Staff, Resident, and Scribe    Scribe Disclosure:   I, JAYME ARSHAD, am serving as a scribe to document services personally performed by Francisco Powers MD based on data collection and the provider's statements to me.     Staff Physician Comments:  I was present with the scribe who participated in the documentation of the note. I have verified the history and personally performed the physical exam and medical decision making. I agree with the assessment and plan as documented in the note. I have reviewed and if necessary amended the note.      Also, I saw and evaluated the patient with the resident and I agree with the assessment and plan as documented in the resident's note.    Francisco Powers MD  Professor  Head of Dermato-Allergy Division  Department of Dermatology  [facility]    Follow-up in Derm-Allergy clinic for 1st readings of patch tests after 2 days  ___________________________    I spent a total of 40 minutes with Tobias Haynes during today s visit. This time was spent discussing all the individual  test results, correlating them to the clinical relevance, counseling the patient and/or coordinating care and performing allergy tests .

## 2024-07-24 ENCOUNTER — OFFICE VISIT (OUTPATIENT)
Dept: ALLERGY | Facility: CLINIC | Age: 29
End: 2024-07-24
Payer: COMMERCIAL

## 2024-07-24 DIAGNOSIS — J30.9 ALLERGIC RHINITIS WITH POSTNASAL DRIP: ICD-10-CM

## 2024-07-24 DIAGNOSIS — Z91.09 HOUSE DUST MITE ALLERGY: Primary | ICD-10-CM

## 2024-07-24 DIAGNOSIS — R09.82 ALLERGIC RHINITIS WITH POSTNASAL DRIP: ICD-10-CM

## 2024-07-24 PROCEDURE — 99207 PR NO CHARGE LOS: CPT | Performed by: DERMATOLOGY

## 2024-07-24 RX ORDER — MOMETASONE FUROATE MONOHYDRATE 50 UG/1
2 SPRAY, METERED NASAL AT BEDTIME
Qty: 17 G | Refills: 3 | Status: SHIPPED | OUTPATIENT
Start: 2024-07-24

## 2024-07-24 NOTE — PROGRESS NOTES
[facility] Dermato-allergology Note  Office visit  Encounter Date: Jul 24, 2024  ____________________________________________    CC: Asthma Recheck (Patch test day 3)      HPI:  (Jul 24, 2024)  Ms. Tobias Haynes is a(n) 28 year old female who presents today as a return patient for allergy tests as planned  - Follow-up in Derm-Allergy clinic for 1st readings of patch tests after 2 days  - Otherwise feeling well in usual state of health    Physical Exam:  General: In no acute distress, well-developed, well-nourished  Eyes: no conjunctivitis  ENT: no signs of rhinitis   Pulmonary: no wheezing or coughing  Skin: Focused examination of the skin on test sites was performed = see test results below  No active eczematous skin lesions on tests sites, particularly back    Earlier History and Allergy Exams:  (Jul 22, 2024)  - Follow-up in Derm-Allergy clinic for patch tests (and prick tests pending Dr. Ann's blood results from 4/19/24); stop all antihistamines 5-7 days prior  - 4/19/24 Results from specific IgEs ordered by Dr. Ann:  The following were negative:  Alternaria alternata  Aspergillus fumigatus   Birch  Cat  Cladosporium herbarum   Cocklebur  Cockroach  Common Ragweed  Dermatophagoides farinae   Dermatophagoides pteronyssinus   Dog  Elm  Epicoccum purpur   Maple ([place])  Rough Marsh Elder  Shade Grass  White Oak  White Gabriel    (Apr 23, 2024)  New patient for allergy consultation  - Referred by Dr. Josiah Ann for [place] panel patch test for rash. 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  Recently reacted to coban on her arm.  At home seems to get rashes on face- of note rubbing alcohol helps the rash.   - Recently had Nexplanon replaced ~1.5 weeks ago (see 4/11/24 note by Lashawn Christianson, NP with [facility])  - Wrap that was used on arm caused itchy rash; she has never had a wrap on as long as it was one  - Iodine was used  by OB/GYN's staff to disinfect the area  - Wears latex-free gloves at her job as a phlebotomist  - She, mom, and sister all have itchy nose  - For her, will spread out from her nose to her face; lasts for a couple hours  - Has had hay fever and spontaneous allergies since high school  - Pony is a trigger in late spring  - Asthma from childhood to present  - Uses albuterol infrequently  - Dyes her hair, but last done     Skin: Focused examination of the face, hairline, arms, hands was performed.  - On the left upper arm immediately above the elbow, there is an eczematous reaction with slight hyperpigmentation, xerosis, and papular vesicles present.   - Hyperpigmentation on dorsal lateral and palmar right hand.  - Eczematous plaques on the forehead along the hairline.    Past Medical History:   There is no problem list on file for this patient.    No past medical history on file.    Allergies:  No Known Allergies    Medications:  Current Outpatient Medications   Medication Sig Dispense Refill    albuterol (PROAIR HFA/PROVENTIL HFA/VENTOLIN HFA) 108 (90 Base) MCG/ACT inhaler Inhale 1-2 puffs into the lungs      etonogestrel (NEXPLANON) 68 MG IMPL Inject 1 each Subcutaneous      ibuprofen (ADVIL/MOTRIN) 600 MG tablet Take 1 tablet (600 mg) by mouth every 6 hours as needed for moderate pain 30 tablet 0    mometasone (NASONEX) 50 MCG/ACT nasal spray Spray 2 sprays into both nostrils at bedtime 17 g 3     No current facility-administered medications for this visit.     Social History:  The patient works as a phlebotomist. Patient has the following hobbies or non-occupational exposure: spending time with her son.    Family History:  No family history on file.    Previous Labs, Allergy Tests, Dermatopathology, Imagin24 Dr. Josiah nAn ([facility])  From \[facility]\"":  History of a few different allergy concerns. Nose congestion, sneezing, itchy watery eyes. All year-round. Seasonally worse. Few years.  Medications are not adequately helping. At times symptoms can be bad. Interested in allergy testing.    She currently uses nonlatex gloves at work. Has noticed itching, burning, rash and discoloration on both hands. For the past 1 year. Interested in testing for allergies for that.    Itching of the tip of the nose as well. Sometimes itching of the cheek and forehead as well. No obvious rash. This has been ongoing for more than 1 year.    Currently does have history of asthma. Per patient controlled. Using albuterol as needed.    History of local reaction with itching and redness after using wrap     Impressions:  Rash-both hands  Itching-nose, cheeks, forehead  Allergic rhinitis  Allergic conjunctivitis  Inadequate response to treatment  Per patient suspected allergy to nonlatex gloves     Recommendations:  We talked about allergic and nonallergic causes  We talked about allergy skin testing versus blood testing  Check blood test for environmental allergies  We talked about allergy patch testing. Suggested referral to Dr. Frnacisco Powers for possible [place] American panel patch testing and second opinion regarding skin rash with discoloration affecting both hands that is possibly after nonlatex glove exposure per patient  Suggested triamcinolone-0.1% ointment topically twice daily as needed  Flonase 2 sprays each nostril daily  Astelin 2 sprays each nostril twice daily  Pataday-0.2% eyedrops-1 drop in each eye once daily as needed  Zyrtec 10 mg by mouth once or twice daily  Other self-care techniques discussed  All questions answered  Follow-up in a month or sooner if problems     2/22/24 ED visit for Acute Bronchitis with Asthma ([facility])  From HPI:  Presents to the emergency department for evaluation of cough. The patient states that she has had a productive cough since Saturday 2/17. She states that she did have a fever of 101 F. She states that she went to urgent care where she tested negative for influenza. She  states that was given prednisone and took this for two nights. She states that this did help with her cough. She states that today her cough is worse and is accompanied with wheezing. She also endorses rhinorrhea. She states that she has been using over the counter cough medications. She also notes that she tested negative for COVID-19 with an at home test kit. She also reports that she has a history of asthma and did use her inhaler this morning. She denies sore throat, and taking prescription medications for this. She denies further fevers or chest pain or pleuritic pain.    From A&P:  Vitally stable here although slightly tachycardic. She is overall well appearing. She is not hypoxic or in respiratory distress. She had recent influenza testing which was negative. COVID PCR testing here is negative. Chest x-ray obtained shows no lobar consolidation or significant pleural effusion. Denies chest pain and I doubt ACS or PE in light of the above presentation and symptoms. Overall suspect acute bronchitis. We will extend her course of steroids and prescribed medications below. She request for her own nebulizer which was written for her. Recommend she follow-up with her primary care doctor regards to her ER visit today. She is comfortable this plan. Discussed return precautions for the emergency department. All questions as per discharge.    Referred By: Josiah Ann MD ([facility])  [id number] [address] [place]  [address] [address]     Allergy Tests:  Ordered by Dr. Ann on 4/19/24 and Pending as of 4/22/24 - see above in Prior Exams section for 7/22/24 note  ALTERNARIA ALTERNATA (M6) IGE   ASPERGILLUS FUMIGATUS IGE   BIRCH (T3) IGE  CAT DANDER (E1) IGE   CLADOSPORIUM HERBARUM IGE   COCKLEBUR IGE   COCKROACH (I6) IGE   COMMON RAGWEED (W1) IGE   D FARINAE (D2) IGE   D PTERONYSSINUS (D1) IGE   DOG DANDER (E5) IGE   ELM (T8) IGE   EPICOCCUM PURPUR IGE   MAPLE (BOX ELDER) (T1)IGE   ROUGH MARSH ELDER (W16)  IGE  AGUSTÍN GRASS (G6) IGE   WHITE GOPAL (T15) IGE   WHITE OAK (T7) IGE     Order for Future Allergy Testing:    [x] Outpatient  [] Inpatient: [facility]..../ Bed ....       Skin Atopy (atopic dermatitis) [x] Yes   [] No .........see HPI  Contact allergies:   [x] Yes   [] No ..........see HPI  Hand eczema:   [] Yes   [x] No           Leading hand:   [x] R   [] L       [] Ambidextrous         Drug allergies:        [] Yes   [x] No  which?......    Urticaria/Angioedema  [] Yes   [x] No .........  Food Allergy:  [] Yes   [x] No  which?......  Pets :  [] Yes   [x] No  which?......none at home and no reaction to cats or dogs        [x]  Rhinitis   [] Conjunctivitis   [] Sinusitis   [] Polyposis   [] Otitis   [] Pharyngitis         []  Postnasal drip    []  none  Operations:   [] Tonsils   [] Septum   [] Sinus   [] Polyposis        [x] Asthma bronchiale   [] Coughing      []  none  Symptoms (mostly Rhinoconjunctivitis and Asthma) aggravated by:  Season   [] I   [] II   [] III   [x] IV   [x]V   [x]VI   []VII   []VIII   []IX   []X   []XI   []XII     [x] perennial   Day time      [] morning   [] noon      [] evening        [] night    [] whole day........  [x]  none  Location/changes    [] inside        [] outside   [] mountains    [] sea     [] others.............   [x]  none  Triggers, specific     [] animals     [] plants     [] dust              [] others ...........................    [x]  none  Triggers, others       [] work          [] psyche    [] sport            [] others .............................  [x]  none  Irritant                [] phys efforts [] smoke    [] heat/cold     [] odors  []others............... [x]  none    Order for PATCH TESTS  Reason for tests (suspected allergy): recurrent eczema on hands, face, and acutely on upper arms to forearms  Known previous allergies: none  Standardized panels  [x] Standard panel (40 tests)  [x] Preservatives & Antimicrobials (31 tests)  [x] Emulsifiers & Additives (25  tests)   [x] Perfumes/Flavours & Plants (25 tests)  [] Hairdresser panel (12 tests)  [x] Rubber Chemicals (22 tests)  [x] Plastics (26 tests)  [] Colorants/Dyes/Food additives (20 tests)  [] Metals (implants/dental) (24 tests)  [] Local anaesthetics/NSAIDs (13 tests)  [] Antibiotics & Antimycotics (14 tests)   [] Corticosteroids (15 tests)   [] Photopatch test (62 tests)   [] others: ...      [] Patient's own products: ...  DO NOT test if chemical or biological identity is unknown!   always ask from patient the product information and safety sheets (MSDS)       Order for PRICK TESTS    Reason for tests (suspected allergy): perennial RC with seasonal aggravation in late spring/early summer  Known previous allergies: see above prior records - specific IgE in 4/2024 blood tests all negative    Standardized prick panels  [x] Atopic panel (20 tests)  [] Pediatric Panel (12 tests)  [] Milk, Meat, Eggs, Grains (20 tests)   [] Dust, Epithelia, Feathers (10 tests)  [] Fish, Seafood, Shellfish (17 tests)  [] Nuts, Beans (14 tests)  [] Spice, Vegetable, Fruit (17 tests)  [] Pollen Panel = Tree, Grass, Weed (24 tests)  [] Others: ...      [] Patient's own products: ...  DO NOT test if chemical or biological identity is unknown!   always ask from patient the product information and safety sheets (MSDS)     Standardized intradermal tests  [x] Penicillium notatum [x] Aspergillus fumigatus [x] House dust mites D.far & D. pteron  [] Cat    [] dog  [x] Others: Alternaria  [] Bee venom   [] Wasp venom  !!Specific protocol with dilutions!!       Order for Drug allergy tests (prick & Intradermal & patch tests)    [] Penicillin G  [] Ampicillin [] Cefazolin   [] Ceftriaxone   [] Ceftazidime  [] Bactrim    [] Others: ...  Order for ... as test date    [] Patient needs consultation with Allergy team (changes of tests may apply)  [x] Tests discussed with Allergy team (can have direct appointment for allergy tests)     RESULTS & EVALUATION  of PATCH TESTS    Jul 22, 2024 application of patch tests:    Patch test readings after     [x] 2 days, [] 3 days [x] 4 days, [] 5 days,  Other duration: ...    STANDARD Series                                          # Substance 2 days 4 days remarks     1 Ronnie Mix [C] - -       2 Colophony - -       3  2-Mercaptobenzothiazole  - -       4 Methylisothiazolinone - -       5 Carba Mix - -       6 Thiuram Mix [A] - -       7 Bisphenol A Epoxy Resin - -       8 Y-Jwnj-Anacflcojus-Formaldehyde Resin - -       9 Mercapto Mix [A] - -       10 Black Rubber Mix- PPD [B] - -       11 Potassium Dichromate  -  -       12 Balsam of [place] (Myroxylon Pereirae Resin) - -       13 Nickel Sulphate Hexahydrate - -       14 Mixed Dialkyl Thiourea - -       15 Paraben Mix [B] - -       16 Methyldibromo Glutaronitrile - -       17 Fragrance Mix 8% - -       18 2-Bromo-2-Nitropropane-1,3-Diol (Bronopol) CT - -       19 Lyral - -       20 Tixocortol-21- Pivalate CT - -       21 Diazolidinyl urea (Germall II) - -        22 Methyl Methacrylate - -       23 Cobalt (II) Chloride Hexahydrate - -       24 Fragrance Mix II  - -       25 Compositae Mix - -       26 Benzoyl Peroxide - -       27 Bacitracin - -       28 Formaldehyde - -       29 Methylchloroisothiazolinone / Methylisothiazolinone - -       30 Corticosteroid Mix CT - -       31 Sodium Lauryl Sulfate - -       32 Lanolin Alcohol - -       33 Turpentine - -       34 Cetylstearylalcohol - -       35 Chlorhexidine Dicluconate - -       36 Budesonide - -       37 Imidazolidinyl Urea  - -       38 Ethyl-2 Cyanoacrylate - -       39 Quaternium 15 (Dowicil 200) - -       40 Decyl Glucoside - -     PRESERVATIVES & ANTIMICROBIALS        # Substance 2 days 4 days remarks   41 1  1,2-Benzisothiazoline-3-One, Sodium Salt - -     2  1,3,5-Jesus Manuel (2-Hydroxyethyl) - Hexahydrotriazine (Grotan BK) - -     3 1-Qfoutfhgvsccv-3-Nitro-1, 3-Propanediol NA NA     4  3, 4, 4' - Triclocarban - -    45 5 4 -  Chloro - 3 - Cresol - -     6 4 - Chloro - 4 - Xylenol (PCMX) - -     7 7-Ethylbicyclooxazolidine (Bioban [id number]) - -     8 Benzalkonium Chloride CT - -     9 Benzyl Alcohol - -    50 10 Cetalkonium Chloride - -     11 Cetylpyrimidine Chloride  - -     12 Chloroacetamide - -     13 DMDM Hydantoin - -     14 Glutaraldehyde - -    55 15 Triclosan - -     16 Glyoxal Trimeric Dihydrate - -     17 Iodopropynyl Butylcarbamate - -     18 Octylisothiazoline - -     19 Bithionol CT NA NA    60 20 Bioban P 1487 (Nitrobutyl) Morpholine/(Ethylnitro-Trimethylene) Dimorpholine - -     21 Phenoxyethanol - -     22 Phenyl Salicylate - -     23 Povidone Iodine - -     24 Sodium Benzoate - -    65 25 Sodium Disulfite - -     26 Sorbic Acid - -     27 Thimerosal - -     28 Melamine Formaldehyde Resin - -     29 Ethylenediamine Dihydrochloride - -      Parabens      70 30 Butyl-P-Hydroxybenzoate - -     31 Ethyl-P-Hydroxybenzoate - -     32 Methyl-P-Hydroxybenzoate - -    73 33 Propyl-P-Hydroxybenzoate - -    EMULSIFIERS & ADDITIVES       # Substance 2 days 4 days remarks   74 1 Polyethylene Glycol-400 - -    75 2 Cocamidopropyl Betaine - -     3 Amerchol L101 - -     4 Propylene Glycol - -     5 Triethanolamine - -     6 Sorbitane Sesquiolate CT - -    80 7 Isopropylmyristate - -     8 Polysorbate 80 CT - -     9 Amidoamine   (Stearamidopropyl Dimethylamine) - -     10 Oleamidopropyl Dimethylamine - -     11 Lauryl Glucoside - -    85 12 Coconut Diethanolamide  - -     13 2-Hydroxy-4-Methoxy Benzophenone (Oxybenzone) - -     14 Benzophenone-4 (Sulisobenzon) - -     15 Propolis - -     16 Dexpanthenol - -    90 17 Abitol - -     18 Tert-Butylhydroquinone - -     19 Benzyl Salicylate - -     20 Dimethylaminopropylamin (DMPA) - -     21 Zinc Pyrithione (Zinc Omadine)  - -    95 22 Jesus Manuel(Hydroxymethyl) Nitromethane  - -      Antioxidant       23 Dodecyl Gallate - -     24 Butylhydroxyanisole (BHA) - -     25 Butylhydroxytoluene (BHT) - -      26 Di-Alpha-Tocopherol (Vit E) - -    100 27 Propyl Gallate - -    PERFUMES, FLAVORS & PLANTS        # Substance 2 days 4 days remarks   101 1 Benzyl Cinnamate - -     2 Di-Limonene (Dipentene) - -     3 Cananga Odorata (Corey Nicole) (I) - -     4 Lichen Acid Mix - -    105 5 Mentha Piperita Oil (Peppermint Oil) - -     6 Sesquiterpenelactone mix - -     7 Tea Tree Oil, Oxidized - -     8 Wood Tar Mix - -     9 Abietic Acid - -    110 10 Lavendula Angustifolia Oil (Lavender Oil) - -     11 Fragrance mix II CT * 14% - -      Fragrance Mix I       12 Oakmoss Absolute - -     13 Eugenol - -     14 Geraniol - -    115 15 Hydroxycitronellal - -     16 Isoeugenol - -     17 Cinnamic Aldehyde - -     18 Cinnamic Alcohol  - -      Fragrance mix II       19 Citronellol - -    120 20 Alpha-Hexylcinnamic Aldehyde    - -     21 Citral - -     22 Farnesol - -    123 23 Coumarin - -    Hexylcinnamic aldehyde, Coumarin, Farnesol, Hydroxyisohexy3-cyclohexene carboxaldehyde, citral, citrolellol  RUBBER CHEMICALS        # Substance 2 days 4 days remarks     Carbamate      124 1 Zinc Bis ( Diethyldithio carbamate ) (ZDEC) - -    125 2 Zinc Bis (Dibutyldithiocarbamate) - -     3 1,3-Diphenylguanidine (DPG) - -      Thiourea       4 Dibutylthiourea - -     5 Diphenyltiourea - -     6 Thiourea - -      Mercapto chemicals      130 7 Morpholinyl Mercaptobenzothiazole - -     8 Q-Oufernywct-0-Benzothiazyl-Sulfenamide - -     9 Dibenzothiazyl Disulfide - -      Thiuram chemicals       10 Dipentamethylenethiuram Disulfide - -     11 Tetraethylthiuram Disulfide (Disulfiram) - -    135 12 Tetramethylthiuram Disulfide - -     13 Tetramethyl Thiuram Monosulfide (TMTM) - -      4-Phenylenediamine derivatives       14 N-Isopropyl-N'-Phenyl-P-Phenylenediamine (IPPD) - -     15 Joocojlj-R-Gqubcraqlcqgevds (DPPD) - -      Various Rubber Additives       16 Hydroquinone Monobenzylether  - -    140 17 Hexamethylenetetramine - -     18  4,4'-Dihydroxybiphenyl - -     19 Cyclohexylthiophtalimide - -    143 20 N-Phenyl-B-Naphthylamine - -    PLASTICS (Acrylates/Epoxy Systems)       # Substance 2 days 4 days remarks     Acrylates - -    144 1 2-Hydroxyethyl Methacrylate (HEMA) - -    145 2 1,4-Butandioldimethacrylate (BUDMA) - -     3  2-Ethylhexyl Acrylate - -     4 Bisphenol-A-Dimethacrylate  - -     5 Diurethane-Dimethacrylate - -     6 Ethyleneglycoldimethacrylate (EGDMA) - -    150 7 Pentaerythritoltriacrylate (ZAKI) - -     8 Triethylene Glycol Dimethacrylate (TEGDMA) - -      Synthetic material/additives        9 J-Plkf-Wmfqotfzyza - -     10 Tricresyl Phosphate - -     11 0-Irvu-Anvrodlxbubgr - -    155 12 Dioctyl phtalate(DEHP,DOP) / (Dimethylhexylphthalate)  - -     13 Dibutylphthalate - -     14 Dimethylphthalate - -     15 Toluene-2,4-Diisocyanate - -     16 Diphenylmethane-4,4''-Diisocyanate NA NA      EPOXY RESIN SYSTEMS        Reactive Solvents - -    160 17 Cresyl Glycidyl Ether - -     18 Butyl Glycidyl Ether - -     19 Phenyl Glycidyl Ether - -     20 1,4-Butanediol Diglycidyl Ether - -     21 1,6-Hexanediole Diglycidyl Ether - -      Hardener / Accelerator - -    165 22 Triethylenetetramine - -     23 Diethylenetriamine - -     24 Isophorone Diamine (IPD) - -     25 N,N-Dimethyl-P-Toluidine - -    169 26 Isobornyl Acrylate - -       Results of patch tests:                         Interpretation:  - Negative                    A    = Allergic      (+) Erythema    TI   = Toxic/irritant   + E + Infiltration    RaP = Relevance at Present     ++ E/I + Papulovesicle   Rpr  = Relevance Previously     +++ E/I/P + Blister     nR   = No Relevance    Atopy Screen (Placed Jul 22, 2024)  No Substance Readings (15 min) Evaluation   POS Histamine 1mg/ml ++    NEG NaCl 0.9% -      No Substance Readings (15 min) Evaluation   1 Alternaria alternata (tenuis)  -    2 Cladosporium herbarum -    3 Aspergillus fumigatus -    4 Penicillium notatum -    5  Dermatophagoides pteronyssinus +    6 Dermatophagoides farinae ?    7 Dog epithelium (canis spp) -    8 Cat hair (michelle catus) +    9 Cockroach   (Blatella americana & germanica) -    10 Grass mix midwest   (Cherelle, Orchard, Redtop, Shade) -    11 Stephen grass (sorghum halepense) -    12 Weed mix   (common Cocklebur, Lamb s quarters, rough redroot Pigweed, Dock/Sorrel) -    13 Mug wort (artemisia vulgare) -    14 Ragweed giant/short (ambrosia spp) -    15 White birch (Betula papyrifera) -    16 Tree mix 1 (Pecan, Maple BHR, Oak RVW, american [place], black [place]) -    17 Red cedar (juniperus virginia) -    18 Tree mix 2   (white Gabriel, river/red Birch, black [place], common [place], american Elm) -    19 Box elder/Maple mix (acer spp) -    20 Carroll shagbark (carya ovata) -    Conclusion: Due to questionable/borderline reactions, will perform IDT.    Intradermal Testing (Placed Jul 22, 2024)  No Substance Conc.  Reading (15min)  immediate Papule [mm] / Erythema [mm] Reading   (2 days)  delayed Papule [mm] / Erythema [mm] Remarks   DF Standard Dust Mite - D. Farinae 1:10 ? 5/5 ++ 10 mm P    DP Standard Dust Mite - D. Pteronyssinus 1:10 - - +/++ 7 mm P    A Aspergillus fumigatus  1:10 - - - -    P Penicillium notatum 1:10 - - + 5/5     Alternaria alternaria 1:10 - - - -    Conclusion: Questionable reaction to dust mite Dermatophagoides farinae, but otherwise no signs for immediate-type reaction. After 2 days, delayed-type reaction to dust mites and less to mold Penicillium. Will check again on Friday.      [x] No relevant allergic reaction observed      [] Allergic reaction diagnosed against following allergens:      Interpretation/Remarks:   See later    [] Patient information given  [] ACDS CAMP information (# ....) to following compounds:  Standard Search:   Advanced Search:   [] General information to following compounds:       Assessment & Plan:    ==> Final Diagnosis:     # Recurrent dermatitis on hands,  face, and acute flares to coban on upper forearms  DDx: Atopic contact dermatitis (Occupational? To rubber gloves? Disinfectants/soaps?)  DDx: Irritant dermatitis with atopic dermatitis  * chronic illness with exacerbation, progression, side effects from treatment    # Suspicion for atopic predisposition with:  Perennial RC with some seasonal aggravation in late spring/early summer = all blood tests negative, but clearly positive delayed-type reaction to dust mite    Dry, hypersensitive skin (atopic dermatitis?)  * chronic illness with exacerbation, progression, side effects from treatment    These conclusions are made at the best of one's knowledge and belief based on the provided evidence such as patient's history and allergy test results and they can change over time or can be incomplete because of missing information.    ==> Treatment Plan:    >> Until patch testing can be completed:  - Continue tacrolimus (Protopic) 0.1% ointment BID.  - Only use triamcinolone 0.1% ointment for acute flares until resolved, and then on weekends in place of tacrolimus if needed.    >> For house dust mite allergy:  - Provided informational handout for house dust mite reduction.  - She can use Flonase nasal spray prescribed by a different provider every evening before she goes to bed, or she can try Nasonex nasal spray. Prescribed with following instructions: spray 2 sprays in each nostril QHS.     Procedures Performed: None    Staff and Scribe: provider    Scribe Disclosure:   I, JAYME ARSHAD, am serving as a scribe to document services personally performed by Francisco Powers MD based on data collection and the provider's statements to me.     Staff Physician Comments:  I was present with the scribe who participated in the documentation of the note. I have verified the history and personally performed the physical exam and medical decision making. I agree with the assessment and plan as documented in the note. I have reviewed and  if necessary amended the note.      Francisco Powers MD  Professor  Head of Dermato-Allergy Division  Department of Dermatology  [facility]    Follow-up in Derm-Allergy clinic for 2nd readings and final conclusions after 4 days   ___________________________    I spent a total of 20 minutes with Tobias Haynes during today s  visit. This time was spent discussing all the individual test results, correlating them to the clinical relevance, counseling the patient and/or coordinating care.

## 2024-07-24 NOTE — LETTER
7/24/2024      Tobias Haynes  [address] [facility] [address]      Dear Colleague,    Thank you for referring your patient, Tobias Haynes, to the [facility] [place]. Please see a copy of my visit note below.    [facility] Dermato-allergology Note  Office visit  Encounter Date: Jul 24, 2024  ____________________________________________    CC: Asthma Recheck (Patch test day 3)      HPI:  (Jul 24, 2024)  Ms. Tobias Haynes is a(n) 28 year old female who presents today as a return patient for allergy tests as planned  - Follow-up in Derm-Allergy clinic for 1st readings of patch tests after 2 days  - Otherwise feeling well in usual state of health    Physical Exam:  General: In no acute distress, well-developed, well-nourished  Eyes: no conjunctivitis  ENT: no signs of rhinitis   Pulmonary: no wheezing or coughing  Skin: Focused examination of the skin on test sites was performed = see test results below  No active eczematous skin lesions on tests sites, particularly back    Earlier History and Allergy Exams:  (Jul 22, 2024)  - Follow-up in Derm-Allergy clinic for patch tests (and prick tests pending Dr. Ann's blood results from 4/19/24); stop all antihistamines 5-7 days prior  - 4/19/24 Results from specific IgEs ordered by Dr. Ann:  The following were negative:  Alternaria alternata  Aspergillus fumigatus   Birch  Cat  Cladosporium herbarum   Cocklebur  Cockroach  Common Ragweed  Dermatophagoides farinae   Dermatophagoides pteronyssinus   Dog  Elm  Epicoccum purpur   Maple (Pacific)  Rough Marsh Elder  Shade Grass  White Oak  White Gabriel    (Apr 23, 2024)  New patient for allergy consultation  - Referred by Dr. Josiah Ann for [place] American panel patch test for rash. 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  Recently reacted to coban on her arm.  At home seems to get rashes on face- of note rubbing  alcohol helps the rash.   - Recently had Nexplanon replaced ~1.5 weeks ago (see 4/11/24 note by Lashawn Christianson, NP with [facility])  - Wrap that was used on arm caused itchy rash; she has never had a wrap on as long as it was one  - Iodine was used by OB/GYN's staff to disinfect the area  - Wears latex-free gloves at her job as a phlebotomist  - She, mom, and sister all have itchy nose  - For her, will spread out from her nose to her face; lasts for a couple hours  - Has had hay fever and spontaneous allergies since high school  - [place] is a trigger in late spring  - Asthma from childhood to present  - Uses albuterol infrequently  - Dyes her hair, but last done 2021    Skin: Focused examination of the face, hairline, arms, hands was performed.  - On the left upper arm immediately above the elbow, there is an eczematous reaction with slight hyperpigmentation, xerosis, and papular vesicles present.   - Hyperpigmentation on dorsal lateral and palmar right hand.  - Eczematous plaques on the forehead along the hairline.    Past Medical History:   There is no problem list on file for this patient.    No past medical history on file.    Allergies:  No Known Allergies    Medications:  Current Outpatient Medications   Medication Sig Dispense Refill     albuterol (PROAIR HFA/PROVENTIL HFA/VENTOLIN HFA) 108 (90 Base) MCG/ACT inhaler Inhale 1-2 puffs into the lungs       etonogestrel (NEXPLANON) 68 MG IMPL Inject 1 each Subcutaneous       ibuprofen (ADVIL/MOTRIN) 600 MG tablet Take 1 tablet (600 mg) by mouth every 6 hours as needed for moderate pain 30 tablet 0     mometasone (NASONEX) 50 MCG/ACT nasal spray Spray 2 sprays into both nostrils at bedtime 17 g 3     No current facility-administered medications for this visit.     Social History:  The patient works as a phlebotomist. Patient has the following hobbies or non-occupational exposure: spending time with her son.    Family History:  No family history on  file.    Previous Labs, Allergy Tests, Dermatopathology, Imagin24 Dr. Josiah Ann ([facility])  From [facility]:  History of a few different allergy concerns. Nose congestion, sneezing, itchy watery eyes. All year-round. Seasonally worse. Few years. Medications are not adequately helping. At times symptoms can be bad. Interested in allergy testing.    She currently uses nonlatex gloves at work. Has noticed itching, burning, rash and discoloration on both hands. For the past 1 year. Interested in testing for allergies for that.    Itching of the tip of the nose as well. Sometimes itching of the cheek and forehead as well. No obvious rash. This has been ongoing for more than 1 year.    Currently does have history of asthma. Per patient controlled. Using albuterol as needed.    History of local reaction with itching and redness after using wrap     Impressions:  Rash-both hands  Itching-nose, cheeks, forehead  Allergic rhinitis  Allergic conjunctivitis  Inadequate response to treatment  Per patient suspected allergy to nonlatex gloves     Recommendations:  We talked about allergic and nonallergic causes  We talked about allergy skin testing versus blood testing  Check blood test for environmental allergies  We talked about allergy patch testing. Suggested referral to Dr. Francisco Powers for possible North American panel patch testing and second opinion regarding skin rash with discoloration affecting both hands that is possibly after nonlatex glove exposure per patient  Suggested triamcinolone-0.1% ointment topically twice daily as needed  Flonase 2 sprays each nostril daily  Astelin 2 sprays each nostril twice daily  Pataday-0.2% eyedrops-1 drop in each eye once daily as needed  Zyrtec 10 mg by mouth once or twice daily  Other self-care techniques discussed  All questions answered  Follow-up in a month or sooner if problems     24 ED visit for Acute Bronchitis with Asthma ([facility])  From [facility]:  Presents to  the emergency department for evaluation of cough. The patient states that she has had a productive cough since Saturday 2/17. She states that she did have a fever of 101 F. She states that she went to urgent care where she tested negative for influenza. She states that was given prednisone and took this for two nights. She states that this did help with her cough. She states that today her cough is worse and is accompanied with wheezing. She also endorses rhinorrhea. She states that she has been using over the counter cough medications. She also notes that she tested negative for COVID-19 with an at home test kit. She also reports that she has a history of asthma and did use her inhaler this morning. She denies sore throat, and taking prescription medications for this. She denies further fevers or chest pain or pleuritic pain.    From A&P:  Vitally stable here although slightly tachycardic. She is overall well appearing. She is not hypoxic or in respiratory distress. She had recent influenza testing which was negative. COVID PCR testing here is negative. Chest x-ray obtained shows no lobar consolidation or significant pleural effusion. Denies chest pain and I doubt ACS or PE in light of the above presentation and symptoms. Overall suspect acute bronchitis. We will extend her course of steroids and prescribed medications below. She request for her own nebulizer which was written for her. Recommend she follow-up with her primary care doctor regards to her ER visit today. She is comfortable this plan. Discussed return precautions for the emergency department. All questions as per discharge.    Referred By: Josiah Ann MD ([facility])  [id number] [facility] [address] [facility] [address] [place]  [address] [address]     Allergy Tests:  Ordered by Dr. Ann on 4/19/24 and Pending as of 4/22/24 - see above in Prior Exams section for 7/22/24 note  ALTERNARIA ALTERNATA (M6) IGE   ASPERGILLUS FUMIGATUS IGE   BIRCH (T3) IGE  CAT DANDER (E1) IGE    CLADOSPORIUM HERBARUM IGE   COCKLEBUR IGE   COCKROACH (I6) IGE   COMMON RAGWEED (W1) IGE   D FARINAE (D2) IGE   D PTERONYSSINUS (D1) IGE   DOG DANDER (E5) IGE   ELM (T8) IGE   EPICOCCUM PURPUR IGE   MAPLE (BOX ELDER) (T1)IGE   ROUGH MARSH ELDER (W16) IGE  AGUSTÍN GRASS (G6) IGE   WHITE GOPAL (T15) IGE   WHITE OAK (T7) IGE     Order for Future Allergy Testing:    [x] Outpatient  [] Inpatient: [facility]..../ Bed ....       Skin Atopy (atopic dermatitis) [x] Yes   [] No .........see HPI  Contact allergies:   [x] Yes   [] No ..........see HPI  Hand eczema:   [] Yes   [x] No           Leading hand:   [x] R   [] L       [] Ambidextrous         Drug allergies:        [] Yes   [x] No  which?......    Urticaria/Angioedema  [] Yes   [x] No .........  Food Allergy:  [] Yes   [x] No  which?......  Pets :  [] Yes   [x] No  which?......none at home and no reaction to cats or dogs        [x]  Rhinitis   [] Conjunctivitis   [] Sinusitis   [] Polyposis   [] Otitis   [] Pharyngitis         []  Postnasal drip    []  none  Operations:   [] Tonsils   [] Septum   [] Sinus   [] Polyposis        [x] Asthma bronchiale   [] Coughing      []  none  Symptoms (mostly Rhinoconjunctivitis and Asthma) aggravated by:  Season   [] I   [] II   [] III   [x] IV   [x]V   [x]VI   []VII   []VIII   []IX   []X   []XI   []XII     [x] perennial   Day time      [] morning   [] noon      [] evening        [] night    [] whole day........  [x]  none  Location/changes    [] inside        [] outside   [] mountains    [] sea     [] others.............   [x]  none  Triggers, specific     [] animals     [] plants     [] dust              [] others ...........................    [x]  none  Triggers, others       [] work          [] psyche    [] sport            [] others .............................  [x]  none  Irritant                [] phys efforts [] smoke    [] heat/cold     [] odors  []others............... [x]  none    Order for PATCH TESTS  Reason for tests  (suspected allergy): recurrent eczema on hands, face, and acutely on upper arms to forearms  Known previous allergies: none  Standardized panels  [x] Standard panel (40 tests)  [x] Preservatives & Antimicrobials (31 tests)  [x] Emulsifiers & Additives (25 tests)   [x] Perfumes/Flavours & Plants (25 tests)  [] Hairdresser panel (12 tests)  [x] Rubber Chemicals (22 tests)  [x] Plastics (26 tests)  [] Colorants/Dyes/Food additives (20 tests)  [] Metals (implants/dental) (24 tests)  [] Local anaesthetics/NSAIDs (13 tests)  [] Antibiotics & Antimycotics (14 tests)   [] Corticosteroids (15 tests)   [] Photopatch test (62 tests)   [] others: ...      [] Patient's own products: ...  DO NOT test if chemical or biological identity is unknown!   always ask from patient the product information and safety sheets (MSDS)       Order for PRICK TESTS    Reason for tests (suspected allergy): perennial RC with seasonal aggravation in late spring/early summer  Known previous allergies: see above prior records - specific IgE in 4/2024 blood tests all negative    Standardized prick panels  [x] Atopic panel (20 tests)  [] Pediatric Panel (12 tests)  [] Milk, Meat, Eggs, Grains (20 tests)   [] Dust, Epithelia, Feathers (10 tests)  [] Fish, Seafood, Shellfish (17 tests)  [] Nuts, Beans (14 tests)  [] Spice, Vegetable, Fruit (17 tests)  [] Pollen Panel = Tree, Grass, Weed (24 tests)  [] Others: ...      [] Patient's own products: ...  DO NOT test if chemical or biological identity is unknown!   always ask from patient the product information and safety sheets (MSDS)     Standardized intradermal tests  [x] Penicillium notatum [x] Aspergillus fumigatus [x] House dust mites D.far & D. pteron  [] Cat    [] dog  [x] Others: Alternaria  [] Bee venom   [] Wasp venom  !!Specific protocol with dilutions!!       Order for Drug allergy tests (prick & Intradermal & patch tests)    [] Penicillin G  [] Ampicillin [] Cefazolin   [] Ceftriaxone   []  Ceftazidime  [] Bactrim    [] Others: ...  Order for ... as test date    [] Patient needs consultation with Allergy team (changes of tests may apply)  [x] Tests discussed with Allergy team (can have direct appointment for allergy tests)     RESULTS & EVALUATION of PATCH TESTS    Jul 22, 2024 application of patch tests:    Patch test readings after     [x] 2 days, [] 3 days [x] 4 days, [] 5 days,  Other duration: ...    STANDARD Series                                          # Substance 2 days 4 days remarks     1 Ronnie Mix [C] - -       2 Colophony - -       3  2-Mercaptobenzothiazole  - -       4 Methylisothiazolinone - -       5 Carba Mix - -       6 Thiuram Mix [A] - -       7 Bisphenol A Epoxy Resin - -       8 J-Gkna-Esmehvbsvid-Formaldehyde Resin - -       9 Mercapto Mix [A] - -       10 Black Rubber Mix- PPD [B] - -       11 Potassium Dichromate  -  -       12 Balsam of [place] (Myroxylon Pereirae Resin) - -       13 Nickel Sulphate Hexahydrate - -       14 Mixed Dialkyl Thiourea - -       15 Paraben Mix [B] - -       16 Methyldibromo Glutaronitrile - -       17 Fragrance Mix 8% - -       18 2-Bromo-2-Nitropropane-1,3-Diol (Bronopol) CT - -       19 Lyral - -       20 Tixocortol-21- Pivalate CT - -       21 Diazolidinyl urea (Germall II) - -        22 Methyl Methacrylate - -       23 Cobalt (II) Chloride Hexahydrate - -       24 Fragrance Mix II  - -       25 Compositae Mix - -       26 Benzoyl Peroxide - -       27 Bacitracin - -       28 Formaldehyde - -       29 Methylchloroisothiazolinone / Methylisothiazolinone - -       30 Corticosteroid Mix CT - -       31 Sodium Lauryl Sulfate - -       32 Lanolin Alcohol - -       33 Turpentine - -       34 Cetylstearylalcohol - -       35 Chlorhexidine Dicluconate - -       36 Budesonide - -       37 Imidazolidinyl Urea  - -       38 Ethyl-2 Cyanoacrylate - -       39 Quaternium 15 (Dowicil 200) - -       40 Decyl Glucoside - -     PRESERVATIVES & ANTIMICROBIALS         # Substance 2 days 4 days remarks   41 1  1,2-Benzisothiazoline-3-One, Sodium Salt - -     2  1,3,5-Jesus Manuel (2-Hydroxyethyl) - Hexahydrotriazine (Grotan BK) - -     3 3-Liaznbzgsjzat-7-Nitro-1, 3-Propanediol NA NA     4  3, 4, 4' - Triclocarban - -    45 5 4 - Chloro - 3 - Cresol - -     6 4 - Chloro - 4 - Xylenol (PCMX) - -     7 7-Ethylbicyclooxazolidine (Bioban [id number]) - -     8 Benzalkonium Chloride CT - -     9 Benzyl Alcohol - -    50 10 Cetalkonium Chloride - -     11 Cetylpyrimidine Chloride  - -     12 Chloroacetamide - -     13 DMDM Hydantoin - -     14 Glutaraldehyde - -    55 15 Triclosan - -     16 Glyoxal Trimeric Dihydrate - -     17 Iodopropynyl Butylcarbamate - -     18 Octylisothiazoline - -     19 Bithionol CT NA NA    60 20 Bioban P 1487 (Nitrobutyl) Morpholine/(Ethylnitro-Trimethylene) Dimorpholine - -     21 Phenoxyethanol - -     22 Phenyl Salicylate - -     23 Povidone Iodine - -     24 Sodium Benzoate - -    65 25 Sodium Disulfite - -     26 Sorbic Acid - -     27 Thimerosal - -     28 Melamine Formaldehyde Resin - -     29 Ethylenediamine Dihydrochloride - -      Parabens      70 30 Butyl-P-Hydroxybenzoate - -     31 Ethyl-P-Hydroxybenzoate - -     32 Methyl-P-Hydroxybenzoate - -    73 33 Propyl-P-Hydroxybenzoate - -    EMULSIFIERS & ADDITIVES       # Substance 2 days 4 days remarks   74 1 Polyethylene Glycol-400 - -    75 2 Cocamidopropyl Betaine - -     3 Amerchol L101 - -     4 Propylene Glycol - -     5 Triethanolamine - -     6 Sorbitane Sesquiolate CT - -    80 7 Isopropylmyristate - -     8 Polysorbate 80 CT - -     9 Amidoamine   (Stearamidopropyl Dimethylamine) - -     10 Oleamidopropyl Dimethylamine - -     11 Lauryl Glucoside - -    85 12 Coconut Diethanolamide  - -     13 2-Hydroxy-4-Methoxy Benzophenone (Oxybenzone) - -     14 Benzophenone-4 (Sulisobenzon) - -     15 Propolis - -     16 Dexpanthenol - -    90 17 Abitol - -     18 Tert-Butylhydroquinone - -     19 Benzyl  Salicylate - -     20 Dimethylaminopropylamin (DMPA) - -     21 Zinc Pyrithione (Zinc Omadine)  - -    95 22 Jesus Manuel(Hydroxymethyl) Nitromethane  - -      Antioxidant       23 Dodecyl Gallate - -     24 Butylhydroxyanisole (BHA) - -     25 Butylhydroxytoluene (BHT) - -     26 Di-Alpha-Tocopherol (Vit E) - -    100 27 Propyl Gallate - -    PERFUMES, FLAVORS & PLANTS        # Substance 2 days 4 days remarks   101 1 Benzyl Cinnamate - -     2 Di-Limonene (Dipentene) - -     3 Cananga Odorata (Corey Nicole) (I) - -     4 Lichen Acid Mix - -    105 5 Mentha Piperita Oil (Peppermint Oil) - -     6 Sesquiterpenelactone mix - -     7 Tea Tree Oil, Oxidized - -     8 Wood Tar Mix - -     9 Abietic Acid - -    110 10 Lavendula Angustifolia Oil (Lavender Oil) - -     11 Fragrance mix II CT * 14% - -      Fragrance Mix I       12 Oakmoss Absolute - -     13 Eugenol - -     14 Geraniol - -    115 15 Hydroxycitronellal - -     16 Isoeugenol - -     17 Cinnamic Aldehyde - -     18 Cinnamic Alcohol  - -      Fragrance mix II       19 Citronellol - -    120 20 Alpha-Hexylcinnamic Aldehyde    - -     21 Citral - -     22 Farnesol - -    123 23 Coumarin - -    Hexylcinnamic aldehyde, Coumarin, Farnesol, Hydroxyisohexy3-cyclohexene carboxaldehyde, citral, citrolellol  RUBBER CHEMICALS        # Substance 2 days 4 days remarks     Carbamate      124 1 Zinc Bis ( Diethyldithio carbamate ) (ZDEC) - -    125 2 Zinc Bis (Dibutyldithiocarbamate) - -     3 1,3-Diphenylguanidine (DPG) - -      Thiourea       4 Dibutylthiourea - -     5 Diphenyltiourea - -     6 Thiourea - -      Mercapto chemicals      130 7 Morpholinyl Mercaptobenzothiazole - -     8 J-Nhuvbamjzi-1-Benzothiazyl-Sulfenamide - -     9 Dibenzothiazyl Disulfide - -      Thiuram chemicals       10 Dipentamethylenethiuram Disulfide - -     11 Tetraethylthiuram Disulfide (Disulfiram) - -    135 12 Tetramethylthiuram Disulfide - -     13 Tetramethyl Thiuram Monosulfide (TMTM) - -       4-Phenylenediamine derivatives       14 N-Isopropyl-N'-Phenyl-P-Phenylenediamine (IPPD) - -     15 Hyptfggt-L-Nggazggsubxqygir (DPPD) - -      Various Rubber Additives       16 Hydroquinone Monobenzylether  - -    140 17 Hexamethylenetetramine - -     18 4,4'-Dihydroxybiphenyl - -     19 Cyclohexylthiophtalimide - -    143 20 N-Phenyl-B-Naphthylamine - -    PLASTICS (Acrylates/Epoxy Systems)       # Substance 2 days 4 days remarks     Acrylates - -    144 1 2-Hydroxyethyl Methacrylate (HEMA) - -    145 2 1,4-Butandioldimethacrylate (BUDMA) - -     3  2-Ethylhexyl Acrylate - -     4 Bisphenol-A-Dimethacrylate  - -     5 Diurethane-Dimethacrylate - -     6 Ethyleneglycoldimethacrylate (EGDMA) - -    150 7 Pentaerythritoltriacrylate (ZAKI) - -     8 Triethylene Glycol Dimethacrylate (TEGDMA) - -      Synthetic material/additives        9 F-Bsnk-Eokujkkarke - -     10 Tricresyl Phosphate - -     11 8-Cadr-Rzyxclomwlglj - -    155 12 Dioctyl phtalate(DEHP,DOP) / (Dimethylhexylphthalate)  - -     13 Dibutylphthalate - -     14 Dimethylphthalate - -     15 Toluene-2,4-Diisocyanate - -     16 Diphenylmethane-4,4''-Diisocyanate NA NA      EPOXY RESIN SYSTEMS        Reactive Solvents - -    160 17 Cresyl Glycidyl Ether - -     18 Butyl Glycidyl Ether - -     19 Phenyl Glycidyl Ether - -     20 1,4-Butanediol Diglycidyl Ether - -     21 1,6-Hexanediole Diglycidyl Ether - -      Hardener / Accelerator - -    165 22 Triethylenetetramine - -     23 Diethylenetriamine - -     24 Isophorone Diamine (IPD) - -     25 N,N-Dimethyl-P-Toluidine - -    169 26 Isobornyl Acrylate - -       Results of patch tests:                         Interpretation:  - Negative                    A    = Allergic      (+) Erythema    TI   = Toxic/irritant   + E + Infiltration    RaP = Relevance at Present     ++ E/I + Papulovesicle   Rpr  = Relevance Previously     +++ E/I/P + Blister     nR   = No Relevance    Atopy Screen (Placed Jul 22, 2024)  No  Substance Readings (15 min) Evaluation   POS Histamine 1mg/ml ++    NEG NaCl 0.9% -      No Substance Readings (15 min) Evaluation   1 Alternaria alternata (tenuis)  -    2 Cladosporium herbarum -    3 Aspergillus fumigatus -    4 Penicillium notatum -    5 Dermatophagoides pteronyssinus +    6 Dermatophagoides farinae ?    7 Dog epithelium (canis spp) -    8 Cat hair (michelle catus) +    9 Cockroach   (Blatella americana & germanica) -    10 Grass mix midwest   (Cherelle, Orchard, Redtop, Shade) -    11 Stephen grass (sorghum halepense) -    12 Weed mix   (common Cocklebur, Lamb s quarters, rough redroot Pigweed, Dock/Sorrel) -    13 Mug wort (artemisia vulgare) -    14 Ragweed giant/short (ambrosia spp) -    15 White birch (Betula papyrifera) -    16 Tree mix 1 (Pecan, Maple BHR, Oak RVW, american Heppner, black [place]) -    17 Red cedar (juniperus virginia) -    18 Tree mix 2   (white Gabriel, river/red Birch, black Fortuna, common Rooks, american Elm) -    19 Box elder/Maple mix (acer spp) -    20 Prague shagbark (carya ovata) -    Conclusion: Due to questionable/borderline reactions, will perform IDT.    Intradermal Testing (Placed Jul 22, 2024)  No Substance Conc.  Reading (15min)  immediate Papule [mm] / Erythema [mm] Reading   (2 days)  delayed Papule [mm] / Erythema [mm] Remarks   DF Standard Dust Mite - D. Farinae 1:10 ? 5/5 ++ 10 mm P    DP Standard Dust Mite - D. Pteronyssinus 1:10 - - +/++ 7 mm P    A Aspergillus fumigatus  1:10 - - - -    P Penicillium notatum 1:10 - - + 5/5     Alternaria alternaria 1:10 - - - -    Conclusion: Questionable reaction to dust mite Dermatophagoides farinae, but otherwise no signs for immediate-type reaction. After 2 days, delayed-type reaction to dust mites and less to mold Penicillium. Will check again on Friday.      [x] No relevant allergic reaction observed      [] Allergic reaction diagnosed against following allergens:      Interpretation/Remarks:   See later    []  Patient information given  [] ACDS CAMP information (# ....) to following compounds:  Standard Search:   Advanced Search:   [] General information to following compounds:       Assessment & Plan:    ==> Final Diagnosis:     # Recurrent dermatitis on hands, face, and acute flares to coban on upper forearms  DDx: Atopic contact dermatitis (Occupational? To rubber gloves? Disinfectants/soaps?)  DDx: Irritant dermatitis with atopic dermatitis  * chronic illness with exacerbation, progression, side effects from treatment    # Suspicion for atopic predisposition with:  Perennial RC with some seasonal aggravation in late spring/early summer = all blood tests negative, but clearly positive delayed-type reaction to dust mite    Dry, hypersensitive skin (atopic dermatitis?)  * chronic illness with exacerbation, progression, side effects from treatment    These conclusions are made at the best of one's knowledge and belief based on the provided evidence such as patient's history and allergy test results and they can change over time or can be incomplete because of missing information.    ==> Treatment Plan:    >> Until patch testing can be completed:  - Continue tacrolimus (Protopic) 0.1% ointment BID.  - Only use triamcinolone 0.1% ointment for acute flares until resolved, and then on weekends in place of tacrolimus if needed.    >> For house dust mite allergy:  - Provided informational handout for house dust mite reduction.  - She can use Flonase nasal spray prescribed by a different provider every evening before she goes to bed, or she can try Nasonex nasal spray. Prescribed with following instructions: spray 2 sprays in each nostril QHS.     Procedures Performed: None    Staff and Scribe: provider    Scribe Disclosure:   I, JAYME ARSHAD, am serving as a scribe to document services personally performed by Francisco Powers MD based on data collection and the provider's statements to me.     Staff Physician Comments:  I was  present with the scribe who participated in the documentation of the note. I have verified the history and personally performed the physical exam and medical decision making. I agree with the assessment and plan as documented in the note. I have reviewed and if necessary amended the note.      Francisco Powers MD  Professor  Head of Dermato-Allergy Division  Department of Dermatology  [facility]    Follow-up in Derm-Allergy clinic for 2nd readings and final conclusions after 4 days   ___________________________    I spent a total of 20 minutes with Tobias Haynes during today s  visit. This time was spent discussing all the individual test results, correlating them to the clinical relevance, counseling the patient and/or coordinating care.      Again, thank you for allowing me to participate in the care of your patient.        Sincerely,        Francisco Powers MD

## 2024-07-24 NOTE — PATIENT INSTRUCTIONS
House Dust Mite Allergy        The house dust mite is an arachnid about 0.3 mm in size and not visible to the naked eye. There are around 150 species of house dust mites in the world. One mite produces up to 40 fecal droppings a day. One teaspoonful of bedroom dust contains an average of nearly 1000 mites and 250,000 minute droppings.    Causes and triggers of house dust mite allergy  The house dust mite requires a warm, moist environment without light in order to live and reproduce. Our beds are ideal. The mite feeds on human and animal skin scales. The allergen is mainly contained in the mite's feces. The feces contain allergy-triggering constituents which are spread in fine dust, are breathed in and can cause an allergic reaction.    Symptoms  When the allergens come into contact with the mucous membranes in the eyes, nose, mouth and throat, sufferers develop symptoms typical of an allergic cold (allergic rhinitis) or an allergic inflammation of the conjunctiva (allergic conjunctivitis): blocked or runny nose, sneezing, red, itchy eyes. If all of these symptoms are present, then the condition is also known as rhinoconjunctivitis. Often, the upper respiratory tract becomes chronically inflamed, primarily because house dust mites are present all year round.  The symptoms of house dust mite allergy typically occur in the morning and are more frequent in the cold months of the year.    Therapy and treatment  As a first step, mattress, pillows and duvet/comforter should be placed in mite-proof or anti-mite covers, sometimes known as encasings. Alternatively you can use pillows or comforter that can be washed at over 130 F monthly. At the same time, house dust should be minimized. If necessary, the symptoms can be treated with medication, for example antihistamines in the form of nasal sprays, eye drops and tablets. Desensitization/specific immunotherapy (SIT) is recommended for house dust allergy if all the measures  "above are not sufficient.    Tips and tricks:  Keep room temperature at 66-70 F and relative air humidity at a maximum of 50%.  Ideally, thoroughly air your home two to three times a day for 5 to 10 minutes each time.  Wash bed linens in at least 130 F every week.  Remove stuffed animals or freeze them every other week.  Keep ceiling fans off in the bedroom as they can stir up dust mite allergens.  Remove dust from furniture with a damp cloth and regularly wet mop floors.  Do not put pot and hydroponic plants in the bedroom and also avoid putting too many in living areas, as they increase room humidity.  When staying overnight in other accommodations, we recommend taking your own bed linen and the above anti-mite mattress covers with you.  Remove upholstered furniture from the bedroom and consider removing the carpets. Ideally, use sealed parquet or laminate arian, cork tiles or arian made of wood, novilon or PVC.  Maybe additionally reduce dust mites in mattress, upholstery, or arian using hot steam .      Modified from \"House Dust Mite Allergy\" by [employer] [facility].   "

## 2024-07-26 ENCOUNTER — OFFICE VISIT (OUTPATIENT)
Dept: ALLERGY | Facility: CLINIC | Age: 29
End: 2024-07-26
Payer: COMMERCIAL

## 2024-07-26 DIAGNOSIS — J30.89 SEASONAL AND PERENNIAL ALLERGIC RHINOCONJUNCTIVITIS: ICD-10-CM

## 2024-07-26 DIAGNOSIS — J30.9 ALLERGIC RHINITIS WITH POSTNASAL DRIP: ICD-10-CM

## 2024-07-26 DIAGNOSIS — Z91.09 HOUSE DUST MITE ALLERGY: Primary | ICD-10-CM

## 2024-07-26 DIAGNOSIS — Z88.9 ATOPY: ICD-10-CM

## 2024-07-26 DIAGNOSIS — L30.9 HAND DERMATITIS: ICD-10-CM

## 2024-07-26 DIAGNOSIS — L20.89 OTHER ATOPIC DERMATITIS: ICD-10-CM

## 2024-07-26 DIAGNOSIS — J30.2 SEASONAL AND PERENNIAL ALLERGIC RHINOCONJUNCTIVITIS: ICD-10-CM

## 2024-07-26 DIAGNOSIS — R09.82 ALLERGIC RHINITIS WITH POSTNASAL DRIP: ICD-10-CM

## 2024-07-26 DIAGNOSIS — H10.10 SEASONAL AND PERENNIAL ALLERGIC RHINOCONJUNCTIVITIS: ICD-10-CM

## 2024-07-26 DIAGNOSIS — L24.9 IRRITANT DERMATITIS: ICD-10-CM

## 2024-07-26 PROCEDURE — 99214 OFFICE O/P EST MOD 30 MIN: CPT | Mod: GC | Performed by: DERMATOLOGY

## 2024-07-26 NOTE — PROGRESS NOTES
[facility] Dermato-allergology Note  Office visit  Encounter Date: Jul 26, 2024  ____________________________________________    CC: RECHECK (Patch day 5)      HPI:  (Jul 26, 2024)  Ms. Tobias Haynes is a(n) 28 year old female who presents today as a return patient for allergy tests as planned  - Follow-up in Derm-Allergy clinic for 2nd readings and final conclusions after 4 days   - Otherwise feeling well in usual state of health    Physical Exam:  General: In no acute distress, well-developed, well-nourished  Eyes: no conjunctivitis  ENT: no signs of rhinitis   Pulmonary: no wheezing or coughing  Skin: Focused examination of the skin on test sites was performed = see test results below  No active eczematous skin lesions on tests sites, particularly back    Earlier History and Allergy Exams:  (Jul 24, 2024)  - Follow-up in Derm-Allergy clinic for 1st readings of patch tests after 2 days    (Jul 22, 2024)  - Follow-up in Derm-Allergy clinic for patch tests (and prick tests pending Dr. Ann's blood results from 4/19/24); stop all antihistamines 5-7 days prior  - 4/19/24 Results from specific IgEs ordered by Dr. Ann:  The following were negative:  Alternaria alternata  Aspergillus fumigatus   Birch  Cat  Cladosporium herbarum   Cocklebur  Cockroach  Common Ragweed  Dermatophagoides farinae   Dermatophagoides pteronyssinus   Dog  Elm  Epicoccum purpur   Maple ([place])  Rough Marsh Elder  Shade Grass  White Oak  White Gabriel    (Apr 23, 2024)  New patient for allergy consultation  - Referred by Dr. Josiah Ann for [place] panel patch test for rash. 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  Recently reacted to coban on her arm.  At home seems to get rashes on face- of note rubbing alcohol helps the rash.   - Recently had Nexplanon replaced ~1.5 weeks ago (see 4/11/24 note by Lashawn Christianson, NP with [facility])  - Wrap that was used  on arm caused itchy rash; she has never had a wrap on as long as it was one  - Iodine was used by OB/GYN's staff to disinfect the area  - Wears latex-free gloves at her job as a phlebotomist  - She, mom, and sister all have itchy nose  - For her, will spread out from her nose to her face; lasts for a couple hours  - Has had hay fever and spontaneous allergies since high school  - [place] is a trigger in late spring  - Asthma from childhood to present  - Uses albuterol infrequently  - Dyes her hair, but last done     Skin: Focused examination of the face, hairline, arms, hands was performed.  - On the left upper arm immediately above the elbow, there is an eczematous reaction with slight hyperpigmentation, xerosis, and papular vesicles present.   - Hyperpigmentation on dorsal lateral and palmar right hand.  - Eczematous plaques on the forehead along the hairline.    Past Medical History:   There is no problem list on file for this patient.    History reviewed. No pertinent past medical history.    Allergies:  No Known Allergies    Medications:  Current Outpatient Medications   Medication Sig Dispense Refill    albuterol (PROAIR HFA/PROVENTIL HFA/VENTOLIN HFA) 108 (90 Base) MCG/ACT inhaler Inhale 1-2 puffs into the lungs      etonogestrel (NEXPLANON) 68 MG IMPL Inject 1 each Subcutaneous      ibuprofen (ADVIL/MOTRIN) 600 MG tablet Take 1 tablet (600 mg) by mouth every 6 hours as needed for moderate pain 30 tablet 0    mometasone (NASONEX) 50 MCG/ACT nasal spray Spray 2 sprays into both nostrils at bedtime 17 g 3     No current facility-administered medications for this visit.     Social History:  The patient works as a phlebotomist. Patient has the following hobbies or non-occupational exposure: spending time with her son.    Family History:  History reviewed. No pertinent family history.    Previous Labs, Allergy Tests, Dermatopathology, Imagin24 Dr. Josiah Ann ([facility])  From [facility]:  History  of a few different allergy concerns. Nose congestion, sneezing, itchy watery eyes. All year-round. Seasonally worse. Few years. Medications are not adequately helping. At times symptoms can be bad. Interested in allergy testing.    She currently uses nonlatex gloves at work. Has noticed itching, burning, rash and discoloration on both hands. For the past 1 year. Interested in testing for allergies for that.    Itching of the tip of the nose as well. Sometimes itching of the cheek and forehead as well. No obvious rash. This has been ongoing for more than 1 year.    Currently does have history of asthma. Per patient controlled. Using albuterol as needed.    History of local reaction with itching and redness after using wrap     Impressions:  Rash-both hands  Itching-nose, cheeks, forehead  Allergic rhinitis  Allergic conjunctivitis  Inadequate response to treatment  Per patient suspected allergy to nonlatex gloves     Recommendations:  We talked about allergic and nonallergic causes  We talked about allergy skin testing versus blood testing  Check blood test for environmental allergies  We talked about allergy patch testing. Suggested referral to Dr. Francisco Powers for possible [place] American panel patch testing and second opinion regarding skin rash with discoloration affecting both hands that is possibly after nonlatex glove exposure per patient  Suggested triamcinolone-0.1% ointment topically twice daily as needed  Flonase 2 sprays each nostril daily  Astelin 2 sprays each nostril twice daily  Pataday-0.2% eyedrops-1 drop in each eye once daily as needed  Zyrtec 10 mg by mouth once or twice daily  Other self-care techniques discussed  All questions answered  Follow-up in a month or sooner if problems     2/22/24 ED visit for Acute Bronchitis with Asthma ([facility])  From HPI:  Presents to the emergency department for evaluation of cough. The patient states that she has had a productive cough since Saturday 2/17. She  states that she did have a fever of 101 F. She states that she went to urgent care where she tested negative for influenza. She states that was given prednisone and took this for two nights. She states that this did help with her cough. She states that today her cough is worse and is accompanied with wheezing. She also endorses rhinorrhea. She states that she has been using over the counter cough medications. She also notes that she tested negative for COVID-19 with an at home test kit. She also reports that she has a history of asthma and did use her inhaler this morning. She denies sore throat, and taking prescription medications for this. She denies further fevers or chest pain or pleuritic pain.    From A&P:  Vitally stable here although slightly tachycardic. She is overall well appearing. She is not hypoxic or in respiratory distress. She had recent influenza testing which was negative. COVID PCR testing here is negative. Chest x-ray obtained shows no lobar consolidation or significant pleural effusion. Denies chest pain and I doubt ACS or PE in light of the above presentation and symptoms. Overall suspect acute bronchitis. We will extend her course of steroids and prescribed medications below. She request for her own nebulizer which was written for her. Recommend she follow-up with her primary care doctor regards to her ER visit today. She is comfortable this plan. Discussed return precautions for the emergency department. All questions as per discharge.    Referred By: Josiah Ann MD ([facility])  [id number] [facility] [address] [place]  [address] [address]     Allergy Tests:  Ordered by Dr. Ann on 4/19/24 and Pending as of 4/22/24 - see above in Prior Exams section for 7/22/24 note  ALTERNARIA ALTERNATA (M6) IGE   ASPERGILLUS FUMIGATUS IGE   BIRCH (T3) IGE  CAT DANDER (E1) IGE   CLADOSPORIUM HERBARUM IGE   COCKLEBUR IGE   COCKROACH (I6) IGE   COMMON RAGWEED (W1) IGE   D FARINAE (D2) IGE   D PTERONYSSINUS  (D1) IGE   DOG DANDER (E5) IGE   ELM (T8) IGE   EPICOCCUM PURPUR IGE   MAPLE (BOX ELDER) (T1)IGE   ROUGH MARSH ELDER (W16) IGE  AGUSTÍN GRASS (G6) IGE   WHITE GOPAL (T15) IGE   WHITE OAK (T7) IGE     Order for Future Allergy Testing:    [x] Outpatient  [] Inpatient: [facility]..../ Bed ....       Skin Atopy (atopic dermatitis) [x] Yes   [] No .........see HPI  Contact allergies:   [x] Yes   [] No ..........see HPI  Hand eczema:   [] Yes   [x] No           Leading hand:   [x] R   [] L       [] Ambidextrous         Drug allergies:        [] Yes   [x] No  which?......    Urticaria/Angioedema  [] Yes   [x] No .........  Food Allergy:  [] Yes   [x] No  which?......  Pets :  [] Yes   [x] No  which?......none at home and no reaction to cats or dogs        [x]  Rhinitis   [] Conjunctivitis   [] Sinusitis   [] Polyposis   [] Otitis   [] Pharyngitis         []  Postnasal drip    []  none  Operations:   [] Tonsils   [] Septum   [] Sinus   [] Polyposis        [x] Asthma bronchiale   [] Coughing      []  none  Symptoms (mostly Rhinoconjunctivitis and Asthma) aggravated by:  Season   [] I   [] II   [] III   [x] IV   [x]V   [x]VI   []VII   []VIII   []IX   []X   []XI   []XII     [x] perennial   Day time      [] morning   [] noon      [] evening        [] night    [] whole day........  [x]  none  Location/changes    [] inside        [] outside   [] mountains    [] sea     [] others.............   [x]  none  Triggers, specific     [] animals     [] plants     [] dust              [] others ...........................    [x]  none  Triggers, others       [] work          [] psyche    [] sport            [] others .............................  [x]  none  Irritant                [] phys efforts [] smoke    [] heat/cold     [] odors  []others............... [x]  none    Order for PATCH TESTS  Reason for tests (suspected allergy): recurrent eczema on hands, face, and acutely on upper arms to forearms  Known previous allergies: none  Standardized  panels  [x] Standard panel (40 tests)  [x] Preservatives & Antimicrobials (31 tests)  [x] Emulsifiers & Additives (25 tests)   [x] Perfumes/Flavours & Plants (25 tests)  [] Hairdresser panel (12 tests)  [x] Rubber Chemicals (22 tests)  [x] Plastics (26 tests)  [] Colorants/Dyes/Food additives (20 tests)  [] Metals (implants/dental) (24 tests)  [] Local anaesthetics/NSAIDs (13 tests)  [] Antibiotics & Antimycotics (14 tests)   [] Corticosteroids (15 tests)   [] Photopatch test (62 tests)   [] others: ...      [] Patient's own products: ...  DO NOT test if chemical or biological identity is unknown!   always ask from patient the product information and safety sheets (MSDS)       Order for PRICK TESTS    Reason for tests (suspected allergy): perennial RC with seasonal aggravation in late spring/early summer  Known previous allergies: see above prior records - specific IgE in 4/2024 blood tests all negative    Standardized prick panels  [x] Atopic panel (20 tests)  [] Pediatric Panel (12 tests)  [] Milk, Meat, Eggs, Grains (20 tests)   [] Dust, Epithelia, Feathers (10 tests)  [] Fish, Seafood, Shellfish (17 tests)  [] Nuts, Beans (14 tests)  [] Spice, Vegetable, Fruit (17 tests)  [] Pollen Panel = Tree, Grass, Weed (24 tests)  [] Others: ...      [] Patient's own products: ...  DO NOT test if chemical or biological identity is unknown!   always ask from patient the product information and safety sheets (MSDS)     Standardized intradermal tests  [x] Penicillium notatum [x] Aspergillus fumigatus [x] House dust mites D.far & D. pteron  [] Cat    [] dog  [x] Others: Alternaria  [] Bee venom   [] Wasp venom  !!Specific protocol with dilutions!!       Order for Drug allergy tests (prick & Intradermal & patch tests)    [] Penicillin G  [] Ampicillin [] Cefazolin   [] Ceftriaxone   [] Ceftazidime  [] Bactrim    [] Others: ...  Order for ... as test date    [] Patient needs consultation with Allergy team (changes of tests may  apply)  [x] Tests discussed with Allergy team (can have direct appointment for allergy tests)     RESULTS & EVALUATION of PATCH TESTS    Jul 22, 2024 application of patch tests:    Patch test readings after     [x] 2 days, [] 3 days [x] 4 days, [] 5 days,  Other duration: ...    STANDARD Series                                          # Substance 2 days 4 days remarks     1 Ronnie Mix [C] - -       2 Colophony - -       3  2-Mercaptobenzothiazole  - -       4 Methylisothiazolinone - -       5 Carba Mix - -       6 Thiuram Mix [A] - -       7 Bisphenol A Epoxy Resin - -       8 S-Kvhq-Qeejjohmbll-Formaldehyde Resin - -       9 Mercapto Mix [A] - -       10 Black Rubber Mix- PPD [B] - -       11 Potassium Dichromate  -  -       12 Balsam of [place] (Myroxylon Pereirae Resin) - -       13 Nickel Sulphate Hexahydrate - -       14 Mixed Dialkyl Thiourea - -       15 Paraben Mix [B] - -       16 Methyldibromo Glutaronitrile - -       17 Fragrance Mix 8% - -       18 2-Bromo-2-Nitropropane-1,3-Diol (Bronopol) CT - -       19 Lyral - -       20 Tixocortol-21- Pivalate CT - -       21 Diazolidinyl urea (Germall II) - -        22 Methyl Methacrylate - -       23 Cobalt (II) Chloride Hexahydrate - -       24 Fragrance Mix II  - -       25 Compositae Mix - -       26 Benzoyl Peroxide - -       27 Bacitracin - -       28 Formaldehyde - -       29 Methylchloroisothiazolinone / Methylisothiazolinone - -       30 Corticosteroid Mix CT - -       31 Sodium Lauryl Sulfate - -       32 Lanolin Alcohol - -       33 Turpentine - -       34 Cetylstearylalcohol - -       35 Chlorhexidine Dicluconate - -       36 Budesonide - -       37 Imidazolidinyl Urea  - -       38 Ethyl-2 Cyanoacrylate - -       39 Quaternium 15 (Dowicil 200) - -       40 Decyl Glucoside - -     PRESERVATIVES & ANTIMICROBIALS        # Substance 2 days 4 days remarks   41 1  1,2-Benzisothiazoline-3-One, Sodium Salt - -     2  1,3,5-Jesus Manuel (2-Hydroxyethyl) - Hexahydrotriazine  (Grotan BK) - -     3 0-Ovnmntdwuijlv-8-Nitro-1, 3-Propanediol NA NA     4  3, 4, 4' - Triclocarban - -    45 5 4 - Chloro - 3 - Cresol - -     6 4 - Chloro - 4 - Xylenol (PCMX) - -     7 7-Ethylbicyclooxazolidine (Bioban AS3584) - -     8 Benzalkonium Chloride CT - -     9 Benzyl Alcohol - -    50 10 Cetalkonium Chloride - -     11 Cetylpyrimidine Chloride  - -     12 Chloroacetamide - -     13 DMDM Hydantoin - -     14 Glutaraldehyde - -    55 15 Triclosan - -     16 Glyoxal Trimeric Dihydrate - -     17 Iodopropynyl Butylcarbamate - -     18 Octylisothiazoline - -     19 Bithionol CT NA NA    60 20 Bioban P 1487 (Nitrobutyl) Morpholine/(Ethylnitro-Trimethylene) Dimorpholine - -     21 Phenoxyethanol - -     22 Phenyl Salicylate - -    63 23 Povidone Iodine - -     24 Sodium Benzoate - -    65 25 Sodium Disulfite - -     26 Sorbic Acid - -     27 Thimerosal - -     28 Melamine Formaldehyde Resin - -     29 Ethylenediamine Dihydrochloride - -      Parabens      70 30 Butyl-P-Hydroxybenzoate - -     31 Ethyl-P-Hydroxybenzoate - -     32 Methyl-P-Hydroxybenzoate - -    73 33 Propyl-P-Hydroxybenzoate - -    EMULSIFIERS & ADDITIVES       # Substance 2 days 4 days remarks   74 1 Polyethylene Glycol-400 - -    75 2 Cocamidopropyl Betaine - -     3 Amerchol L101 - -     4 Propylene Glycol - -     5 Triethanolamine - -     6 Sorbitane Sesquiolate CT - -    80 7 Isopropylmyristate - -     8 Polysorbate 80 CT - -     9 Amidoamine   (Stearamidopropyl Dimethylamine) - -     10 Oleamidopropyl Dimethylamine - -     11 Lauryl Glucoside - -    85 12 Coconut Diethanolamide  - -     13 2-Hydroxy-4-Methoxy Benzophenone (Oxybenzone) - -     14 Benzophenone-4 (Sulisobenzon) - -     15 Propolis - -     16 Dexpanthenol - -    90 17 Abitol - -     18 Tert-Butylhydroquinone - -     19 Benzyl Salicylate - -     20 Dimethylaminopropylamin (DMPA) - -     21 Zinc Pyrithione (Zinc Omadine)  - -    95 22 Jesus Manuel(Hydroxymethyl) Nitromethane  - -       Antioxidant       23 Dodecyl Gallate - -     24 Butylhydroxyanisole (BHA) - -     25 Butylhydroxytoluene (BHT) - -     26 Di-Alpha-Tocopherol (Vit E) - -    100 27 Propyl Gallate - -    PERFUMES, FLAVORS & PLANTS        # Substance 2 days 4 days remarks   101 1 Benzyl Cinnamate - -     2 Di-Limonene (Dipentene) - -     3 Cananga Odorata (Corey Nicole) (I) - -     4 Lichen Acid Mix - -    105 5 Mentha Piperita Oil (Peppermint Oil) - -     6 Sesquiterpenelactone mix - -     7 Tea Tree Oil, Oxidized - -     8 Wood Tar Mix - -     9 Abietic Acid - -    110 10 Lavendula Angustifolia Oil (Lavender Oil) - -     11 Fragrance mix II CT * 14% - -      Fragrance Mix I       12 [place] Absolute - -     13 Eugenol - -     14 Geraniol - -    115 15 Hydroxycitronellal - -     16 Isoeugenol - -     17 Cinnamic Aldehyde - -     18 Cinnamic Alcohol  - -      Fragrance mix II       19 Citronellol - -    120 20 Alpha-Hexylcinnamic Aldehyde    - -     21 Citral - -     22 Farnesol - -    123 23 Coumarin - -    Hexylcinnamic aldehyde, Coumarin, Farnesol, Hydroxyisohexy3-cyclohexene carboxaldehyde, citral, citrolellol  RUBBER CHEMICALS        # Substance 2 days 4 days remarks     Carbamate      124 1 Zinc Bis ( Diethyldithio carbamate ) (ZDEC) - -    125 2 Zinc Bis (Dibutyldithiocarbamate) - -     3 1,3-Diphenylguanidine (DPG) - -      Thiourea       4 Dibutylthiourea - -     5 Diphenyltiourea - -     6 Thiourea - -      Mercapto chemicals      130 7 Morpholinyl Mercaptobenzothiazole - -     8 P-Tugilkuroy-9-Benzothiazyl-Sulfenamide - -     9 Dibenzothiazyl Disulfide - -      Thiuram chemicals       10 Dipentamethylenethiuram Disulfide - -     11 Tetraethylthiuram Disulfide (Disulfiram) - -    135 12 Tetramethylthiuram Disulfide - -     13 Tetramethyl Thiuram Monosulfide (TMTM) - -      4-Phenylenediamine derivatives       14 N-Isopropyl-N'-Phenyl-P-Phenylenediamine (IPPD) - -     15 Zsvtzdnd-T-Ivhapffvqsesbcfs (DPPD) - -      Various  Rubber Additives       16 Hydroquinone Monobenzylether  - -    140 17 Hexamethylenetetramine - -     18 4,4'-Dihydroxybiphenyl - -     19 Cyclohexylthiophtalimide - -    143 20 N-Phenyl-B-Naphthylamine - -    PLASTICS (Acrylates/Epoxy Systems)       # Substance 2 days 4 days remarks     Acrylates - -    144 1 2-Hydroxyethyl Methacrylate (HEMA) - -    145 2 1,4-Butandioldimethacrylate (BUDMA) - -     3  2-Ethylhexyl Acrylate - -     4 Bisphenol-A-Dimethacrylate  - -     5 Diurethane-Dimethacrylate - -     6 Ethyleneglycoldimethacrylate (EGDMA) - -    150 7 Pentaerythritoltriacrylate (ZAKI) - -     8 Triethylene Glycol Dimethacrylate (TEGDMA) - -      Synthetic material/additives        9 E-Gdfi-Srfaoifxefo - -     10 Tricresyl Phosphate - -     11 1-Najj-Nfsrwmwfekktd - -    155 12 Dioctyl phtalate(DEHP,DOP) / (Dimethylhexylphthalate)  - -     13 Dibutylphthalate - -     14 Dimethylphthalate - -     15 Toluene-2,4-Diisocyanate - -     16 Diphenylmethane-4,4''-Diisocyanate NA NA      EPOXY RESIN SYSTEMS        Reactive Solvents - -    160 17 Cresyl Glycidyl Ether - -     18 Butyl Glycidyl Ether - -     19 Phenyl Glycidyl Ether - -     20 1,4-Butanediol Diglycidyl Ether - -     21 1,6-Hexanediole Diglycidyl Ether - -      Hardener / Accelerator - -    165 22 Triethylenetetramine - -     23 Diethylenetriamine - -     24 Isophorone Diamine (IPD) - -     25 N,N-Dimethyl-P-Toluidine - -    169 26 Isobornyl Acrylate - -       Results of patch tests:                         Interpretation:  - Negative                    A    = Allergic      (+) Erythema    TI   = Toxic/irritant   + E + Infiltration    RaP = Relevance at Present     ++ E/I + Papulovesicle   Rpr  = Relevance Previously     +++ E/I/P + Blister     nR   = No Relevance    Atopy Screen (Placed Jul 22, 2024)  No Substance Readings (15 min) Evaluation   POS Histamine 1mg/ml ++    NEG NaCl 0.9% -      No Substance Readings (15 min) Evaluation   1 Alternaria alternata  (tenuis)  -    2 Cladosporium herbarum -    3 Aspergillus fumigatus -    4 Penicillium notatum -    5 Dermatophagoides pteronyssinus +    6 Dermatophagoides farinae ?    7 Dog epithelium (canis spp) -    8 Cat hair (michelle catus) +    9 Cockroach   (Blatella americana & germanica) -    10 Grass mix midwest   (Cherelle, Orchard, Redtop, Shade) -    11 Stephen grass (sorghum halepense) -    12 Weed mix   (common Cocklebur, Lamb s quarters, rough redroot Pigweed, Dock/Sorrel) -    13 Mug wort (artemisia vulgare) -    14 Ragweed giant/short (ambrosia spp) -    15 White birch (Betula papyrifera) -    16 Tree mix 1 (Pecan, Maple BHR, Oak RVW, american [place], black [place]) -    17 Red cedar (juniperus virginia) -    18 Tree mix 2   (white Gabriel, river/red Birch, black [place], common [place], american Elm) -    19 Box elder/Maple mix (acer spp) -    20 [place] shagbark (carya ovata) -    Conclusion: Due to questionable/borderline reactions, will perform IDT.    Intradermal Testing (Placed Jul 22, 2024)  No Substance Conc.  Reading (15min)  immediate Papule [mm] / Erythema [mm] Reading   (2 &   4 days)  delayed Papule [mm] / Erythema [mm] Remarks   DF Standard Dust Mite - D. Farinae 1:10 ? 5/5 ++  ++ 10 mm P  12/12    DP Standard Dust Mite - D. Pteronyssinus 1:10 - - +/++  ++ 7 mm P  13/13     A Aspergillus fumigatus  1:10 - - -  - -  -    P Penicillium notatum 1:10 - - +  - 5/5  -     Alternaria alternaria 1:10 - - -  - -  -    Conclusion: Questionable reaction to dust mite Dermatophagoides farinae, but otherwise no signs for immediate-type reaction. After 2 days, delayed-type reaction to dust mites and less to mold Penicillium. After 4 days, further delayed-type reaction to dust mites, but not to molds.       [x] No relevant allergic reaction observed    [] Allergic reaction diagnosed against following allergens:    Interpretation/Remarks:   The patch tests were negative. But together with the delayed-type reactions to  dust mites, we hypothesize that the skin problems are based on dry, hyperirritable skin, more like irritant-type of dermatitis and not an allergic contact dermatitis.    [] Patient information given  [] ACDS CAMP information (# ....) to following compounds:  Standard Search:   Advanced Search:   [] General information to following compounds:       Assessment & Plan:    ==> Final Diagnosis:     # Recurrent dermatitis on hands, face, and acute flares to coban on upper forearms  Most likely irritant dermatitis with underlying topic dermatitis  No signs for allergic contact sensitization  * chronic illness with exacerbation, progression, side effects from treatment    # Atopic predisposition with:  Perennial RC with some seasonal aggravation in late spring/early summer = all blood tests negative, but clearly positive delayed-type reaction to dust mites and less seasonal mold Alternaria  Dry, hypersensitive skin with atopic dermatitis  * chronic illness with exacerbation, progression, side effects from treatment    These conclusions are made at the best of one's knowledge and belief based on the provided evidence such as patient's history and allergy test results and they can change over time or can be incomplete because of missing information.    ==> Treatment Plan:    >> Updated topical regimen:  - Continue tacrolimus (Protopic) 0.1% ointment BID. Can also apply tacrolimus to nose when skin is itchy/scaly (for possible seborrheic dermatitis).  - Only use triamcinolone 0.1% ointment for acute flares for 4-5 days at most, and then on weekends in place of tacrolimus if needed. Reviewed risks of long-term topical steroid use.    >> For house dust mite allergy:  - Continue with house dust mite reduction as previously discussed.  - Continue with either Flonase or Nasonex nasal sprays QHS PRN.     Procedures Performed: None    Staff Involved: Provider, Staff, Resident, and Scribe    Scribe Disclosure:   JAYME JACKSON am  serving as a scribe to document services personally performed by Francisco Powers MD based on data collection and the provider's statements to me.     Staff Physician Comments:  I was present with the scribe who participated in the documentation of the note. I have verified the history and personally performed the physical exam and medical decision making. I agree with the assessment and plan as documented in the note. I have reviewed and if necessary amended the note.      Also, I saw and evaluated the patient with the resident and I agree with the assessment and plan as documented in the resident's note.    Francisco Powers MD  Professor  Head of Dermato-Allergy Division  Department of Dermatology  [facility]    Follow-up in Derm-Allergy clinic PRN  ___________________________    I spent a total of 30 minutes with Tobias Haynes during today s visit. This time was spent discussing all the individual test results, correlating them to the clinical relevance, counseling the patient and/or coordinating care.

## 2024-07-26 NOTE — LETTER
7/26/2024      Tobias Haynes  [address] [facility] [address]      Dear Colleague,    Thank you for referring your patient, Tobias Haynes, to the [facility] [place]. Please see a copy of my visit note below.    [facility] Dermato-allergology Note  Office visit  Encounter Date: Jul 26, 2024  ____________________________________________    CC: RECHECK (Patch day 5)      HPI:  (Jul 26, 2024)  Ms. Tobias Haynes is a(n) 28 year old female who presents today as a return patient for allergy tests as planned  - Follow-up in Derm-Allergy clinic for 2nd readings and final conclusions after 4 days   - Otherwise feeling well in usual state of health    Physical Exam:  General: In no acute distress, well-developed, well-nourished  Eyes: no conjunctivitis  ENT: no signs of rhinitis   Pulmonary: no wheezing or coughing  Skin: Focused examination of the skin on test sites was performed = see test results below  No active eczematous skin lesions on tests sites, particularly back    Earlier History and Allergy Exams:  (Jul 24, 2024)  - Follow-up in Derm-Allergy clinic for 1st readings of patch tests after 2 days    (Jul 22, 2024)  - Follow-up in Derm-Allergy clinic for patch tests (and prick tests pending Dr. Ann's blood results from 4/19/24); stop all antihistamines 5-7 days prior  - 4/19/24 Results from specific IgEs ordered by Dr. Ann:  The following were negative:  Alternaria alternata  Aspergillus fumigatus   Birch  Cat  Cladosporium herbarum   Cocklebur  Cockroach  Common Ragweed  Dermatophagoides farinae   Dermatophagoides pteronyssinus   Dog  Elm  Epicoccum purpur   Maple ([place])  Rough Marsh Elder  Shade Grass  White Oak  White Gabriel    (Apr 23, 2024)  New patient for allergy consultation  - Referred by Dr. Josiah Ann for [place] American panel patch test for rash. 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   Recently reacted to coban on her arm.  At home seems to get rashes on face- of note rubbing alcohol helps the rash.   - Recently had Nexplanon replaced ~1.5 weeks ago (see 4/11/24 note by Lashawn Christianson, NP with [facility])  - Wrap that was used on arm caused itchy rash; she has never had a wrap on as long as it was one  - Iodine was used by OB/GYN's staff to disinfect the area  - Wears latex-free gloves at her job as a phlebotomist  - She, mom, and sister all have itchy nose  - For her, will spread out from her nose to her face; lasts for a couple hours  - Has had hay fever and spontaneous allergies since high school  - [place] is a trigger in late spring  - Asthma from childhood to present  - Uses albuterol infrequently  - Dyes her hair, but last done 2021    Skin: Focused examination of the face, hairline, arms, hands was performed.  - On the left upper arm immediately above the elbow, there is an eczematous reaction with slight hyperpigmentation, xerosis, and papular vesicles present.   - Hyperpigmentation on dorsal lateral and palmar right hand.  - Eczematous plaques on the forehead along the hairline.    Past Medical History:   There is no problem list on file for this patient.    History reviewed. No pertinent past medical history.    Allergies:  No Known Allergies    Medications:  Current Outpatient Medications   Medication Sig Dispense Refill     albuterol (PROAIR HFA/PROVENTIL HFA/VENTOLIN HFA) 108 (90 Base) MCG/ACT inhaler Inhale 1-2 puffs into the lungs       etonogestrel (NEXPLANON) 68 MG IMPL Inject 1 each Subcutaneous       ibuprofen (ADVIL/MOTRIN) 600 MG tablet Take 1 tablet (600 mg) by mouth every 6 hours as needed for moderate pain 30 tablet 0     mometasone (NASONEX) 50 MCG/ACT nasal spray Spray 2 sprays into both nostrils at bedtime 17 g 3     No current facility-administered medications for this visit.     Social History:  The patient works as a phlebotomist. Patient has the following  hobbies or non-occupational exposure: spending time with her son.    Family History:  History reviewed. No pertinent family history.    Previous Labs, Allergy Tests, Dermatopathology, Imagin24 Dr. Josiah Ann ([facility])  From [facility]:  History of a few different allergy concerns. Nose congestion, sneezing, itchy watery eyes. All year-round. Seasonally worse. Few years. Medications are not adequately helping. At times symptoms can be bad. Interested in allergy testing.    She currently uses nonlatex gloves at work. Has noticed itching, burning, rash and discoloration on both hands. For the past 1 year. Interested in testing for allergies for that.    Itching of the tip of the nose as well. Sometimes itching of the cheek and forehead as well. No obvious rash. This has been ongoing for more than 1 year.    Currently does have history of asthma. Per patient controlled. Using albuterol as needed.    History of local reaction with itching and redness after using wrap     Impressions:  Rash-both hands  Itching-nose, cheeks, forehead  Allergic rhinitis  Allergic conjunctivitis  Inadequate response to treatment  Per patient suspected allergy to nonlatex gloves     Recommendations:  We talked about allergic and nonallergic causes  We talked about allergy skin testing versus blood testing  Check blood test for environmental allergies  We talked about allergy patch testing. Suggested referral to Dr. Francisco Powers for possible North American panel patch testing and second opinion regarding skin rash with discoloration affecting both hands that is possibly after nonlatex glove exposure per patient  Suggested triamcinolone-0.1% ointment topically twice daily as needed  Flonase 2 sprays each nostril daily  Astelin 2 sprays each nostril twice daily  Pataday-0.2% eyedrops-1 drop in each eye once daily as needed  Zyrtec 10 mg by mouth once or twice daily  Other self-care techniques discussed  All questions  answered  Follow-up in a month or sooner if problems     2/22/24 ED visit for Acute Bronchitis with Asthma ([facility])  From HPI:  Presents to the emergency department for evaluation of cough. The patient states that she has had a productive cough since Saturday 2/17. She states that she did have a fever of 101 F. She states that she went to urgent care where she tested negative for influenza. She states that was given prednisone and took this for two nights. She states that this did help with her cough. She states that today her cough is worse and is accompanied with wheezing. She also endorses rhinorrhea. She states that she has been using over the counter cough medications. She also notes that she tested negative for COVID-19 with an at home test kit. She also reports that she has a history of asthma and did use her inhaler this morning. She denies sore throat, and taking prescription medications for this. She denies further fevers or chest pain or pleuritic pain.    From A&P:  Vitally stable here although slightly tachycardic. She is overall well appearing. She is not hypoxic or in respiratory distress. She had recent influenza testing which was negative. COVID PCR testing here is negative. Chest x-ray obtained shows no lobar consolidation or significant pleural effusion. Denies chest pain and I doubt ACS or PE in light of the above presentation and symptoms. Overall suspect acute bronchitis. We will extend her course of steroids and prescribed medications below. She request for her own nebulizer which was written for her. Recommend she follow-up with her primary care doctor regards to her ER visit today. She is comfortable this plan. Discussed return precautions for the emergency department. All questions as per discharge.    Referred By: Josiah Ann MD ([facility])  [address] [place]  [address] [address]     Allergy Tests:  Ordered by Dr. Ann on 4/19/24 and Pending as of 4/22/24 - see  above in Prior Exams section for 7/22/24 note  ALTERNARIA ALTERNATA (M6) IGE   ASPERGILLUS FUMIGATUS IGE   BIRCH (T3) IGE  CAT DANDER (E1) IGE   CLADOSPORIUM HERBARUM IGE   COCKLEBUR IGE   COCKROACH (I6) IGE   COMMON RAGWEED (W1) IGE   D FARINAE (D2) IGE   D PTERONYSSINUS (D1) IGE   DOG DANDER (E5) IGE   ELM (T8) IGE   EPICOCCUM PURPUR IGE   MAPLE (BOX ELDER) (T1)IGE   ROUGH MARSH ELDER (W16) IGE  AGUSTÍN GRASS (G6) IGE   WHITE GOPAL (T15) IGE   WHITE OAK (T7) IGE     Order for Future Allergy Testing:    [x] Outpatient  [] Inpatient: [facility]..../ Bed ....       Skin Atopy (atopic dermatitis) [x] Yes   [] No .........see HPI  Contact allergies:   [x] Yes   [] No ..........see HPI  Hand eczema:   [] Yes   [x] No           Leading hand:   [x] R   [] L       [] Ambidextrous         Drug allergies:        [] Yes   [x] No  which?......    Urticaria/Angioedema  [] Yes   [x] No .........  Food Allergy:  [] Yes   [x] No  which?......  Pets :  [] Yes   [x] No  which?......none at home and no reaction to cats or dogs        [x]  Rhinitis   [] Conjunctivitis   [] Sinusitis   [] Polyposis   [] Otitis   [] Pharyngitis         []  Postnasal drip    []  none  Operations:   [] Tonsils   [] Septum   [] Sinus   [] Polyposis        [x] Asthma bronchiale   [] Coughing      []  none  Symptoms (mostly Rhinoconjunctivitis and Asthma) aggravated by:  Season   [] I   [] II   [] III   [x] IV   [x]V   [x]VI   []VII   []VIII   []IX   []X   []XI   []XII     [x] perennial   Day time      [] morning   [] noon      [] evening        [] night    [] whole day........  [x]  none  Location/changes    [] inside        [] outside   [] mountains    [] sea     [] others.............   [x]  none  Triggers, specific     [] animals     [] plants     [] dust              [] others ...........................    [x]  none  Triggers, others       [] work          [] psyche    [] sport            [] others .............................  [x]  none  Irritant                 [] phys efforts [] smoke    [] heat/cold     [] odors  []others............... [x]  none    Order for PATCH TESTS  Reason for tests (suspected allergy): recurrent eczema on hands, face, and acutely on upper arms to forearms  Known previous allergies: none  Standardized panels  [x] Standard panel (40 tests)  [x] Preservatives & Antimicrobials (31 tests)  [x] Emulsifiers & Additives (25 tests)   [x] Perfumes/Flavours & Plants (25 tests)  [] Hairdresser panel (12 tests)  [x] Rubber Chemicals (22 tests)  [x] Plastics (26 tests)  [] Colorants/Dyes/Food additives (20 tests)  [] Metals (implants/dental) (24 tests)  [] Local anaesthetics/NSAIDs (13 tests)  [] Antibiotics & Antimycotics (14 tests)   [] Corticosteroids (15 tests)   [] Photopatch test (62 tests)   [] others: ...      [] Patient's own products: ...  DO NOT test if chemical or biological identity is unknown!   always ask from patient the product information and safety sheets (MSDS)       Order for PRICK TESTS    Reason for tests (suspected allergy): perennial RC with seasonal aggravation in late spring/early summer  Known previous allergies: see above prior records - specific IgE in 4/2024 blood tests all negative    Standardized prick panels  [x] Atopic panel (20 tests)  [] Pediatric Panel (12 tests)  [] Milk, Meat, Eggs, Grains (20 tests)   [] Dust, Epithelia, Feathers (10 tests)  [] Fish, Seafood, Shellfish (17 tests)  [] Nuts, Beans (14 tests)  [] Spice, Vegetable, Fruit (17 tests)  [] Pollen Panel = Tree, Grass, Weed (24 tests)  [] Others: ...      [] Patient's own products: ...  DO NOT test if chemical or biological identity is unknown!   always ask from patient the product information and safety sheets (MSDS)     Standardized intradermal tests  [x] Penicillium notatum [x] Aspergillus fumigatus [x] House dust mites D.far & D. pteron  [] Cat    [] dog  [x] Others: Alternaria  [] Bee venom   [] Wasp venom  !!Specific protocol with dilutions!!       Order  for Drug allergy tests (prick & Intradermal & patch tests)    [] Penicillin G  [] Ampicillin [] Cefazolin   [] Ceftriaxone   [] Ceftazidime  [] Bactrim    [] Others: ...  Order for ... as test date    [] Patient needs consultation with Allergy team (changes of tests may apply)  [x] Tests discussed with Allergy team (can have direct appointment for allergy tests)     RESULTS & EVALUATION of PATCH TESTS    Jul 22, 2024 application of patch tests:    Patch test readings after     [x] 2 days, [] 3 days [x] 4 days, [] 5 days,  Other duration: ...    STANDARD Series                                          # Substance 2 days 4 days remarks     1 Ronnie Mix [C] - -       2 Colophony - -       3  2-Mercaptobenzothiazole  - -       4 Methylisothiazolinone - -       5 Carba Mix - -       6 Thiuram Mix [A] - -       7 Bisphenol A Epoxy Resin - -       8 I-Yxgz-Qssmnblfroc-Formaldehyde Resin - -       9 Mercapto Mix [A] - -       10 Black Rubber Mix- PPD [B] - -       11 Potassium Dichromate  -  -       12 Balsam of [place] (Myroxylon Pereirae Resin) - -       13 Nickel Sulphate Hexahydrate - -       14 Mixed Dialkyl Thiourea - -       15 Paraben Mix [B] - -       16 Methyldibromo Glutaronitrile - -       17 Fragrance Mix 8% - -       18 2-Bromo-2-Nitropropane-1,3-Diol (Bronopol) CT - -       19 Lyral - -       20 Tixocortol-21- Pivalate CT - -       21 Diazolidinyl urea (Germall II) - -        22 Methyl Methacrylate - -       23 Cobalt (II) Chloride Hexahydrate - -       24 Fragrance Mix II  - -       25 Compositae Mix - -       26 Benzoyl Peroxide - -       27 Bacitracin - -       28 Formaldehyde - -       29 Methylchloroisothiazolinone / Methylisothiazolinone - -       30 Corticosteroid Mix CT - -       31 Sodium Lauryl Sulfate - -       32 Lanolin Alcohol - -       33 Turpentine - -       34 Cetylstearylalcohol - -       35 Chlorhexidine Dicluconate - -       36 Budesonide - -       37 Imidazolidinyl Urea  - -       38 Ethyl-2  Cyanoacrylate - -       39 Quaternium 15 (Dowicil 200) - -       40 Decyl Glucoside - -     PRESERVATIVES & ANTIMICROBIALS        # Substance 2 days 4 days remarks   41 1  1,2-Benzisothiazoline-3-One, Sodium Salt - -     2  1,3,5-Jesus Manuel (2-Hydroxyethyl) - Hexahydrotriazine (Grotan BK) - -     3 8-Rfjbduxnizpxa-1-Nitro-1, 3-Propanediol NA NA     4  3, 4, 4' - Triclocarban - -    45 5 4 - Chloro - 3 - Cresol - -     6 4 - Chloro - 4 - Xylenol (PCMX) - -     7 7-Ethylbicyclooxazolidine (Bioban [id number]) - -     8 Benzalkonium Chloride CT - -     9 Benzyl Alcohol - -    50 10 Cetalkonium Chloride - -     11 Cetylpyrimidine Chloride  - -     12 Chloroacetamide - -     13 DMDM Hydantoin - -     14 Glutaraldehyde - -    55 15 Triclosan - -     16 Glyoxal Trimeric Dihydrate - -     17 Iodopropynyl Butylcarbamate - -     18 Octylisothiazoline - -     19 Bithionol CT NA NA    60 20 Bioban P 1487 (Nitrobutyl) Morpholine/(Ethylnitro-Trimethylene) Dimorpholine - -     21 Phenoxyethanol - -     22 Phenyl Salicylate - -    63 23 Povidone Iodine - -     24 Sodium Benzoate - -    65 25 Sodium Disulfite - -     26 Sorbic Acid - -     27 Thimerosal - -     28 Melamine Formaldehyde Resin - -     29 Ethylenediamine Dihydrochloride - -      Parabens      70 30 Butyl-P-Hydroxybenzoate - -     31 Ethyl-P-Hydroxybenzoate - -     32 Methyl-P-Hydroxybenzoate - -    73 33 Propyl-P-Hydroxybenzoate - -    EMULSIFIERS & ADDITIVES       # Substance 2 days 4 days remarks   74 1 Polyethylene Glycol-400 - -    75 2 Cocamidopropyl Betaine - -     3 Amerchol L101 - -     4 Propylene Glycol - -     5 Triethanolamine - -     6 Sorbitane Sesquiolate CT - -    80 7 Isopropylmyristate - -     8 Polysorbate 80 CT - -     9 Amidoamine   (Stearamidopropyl Dimethylamine) - -     10 Oleamidopropyl Dimethylamine - -     11 Lauryl Glucoside - -    85 12 Coconut Diethanolamide  - -     13 2-Hydroxy-4-Methoxy Benzophenone (Oxybenzone) - -     14 Benzophenone-4  (Sulisobenzon) - -     15 Propolis - -     16 Dexpanthenol - -    90 17 Abitol - -     18 Tert-Butylhydroquinone - -     19 Benzyl Salicylate - -     20 Dimethylaminopropylamin (DMPA) - -     21 Zinc Pyrithione (Zinc Omadine)  - -    95 22 Jesus Manuel(Hydroxymethyl) Nitromethane  - -      Antioxidant       23 Dodecyl Gallate - -     24 Butylhydroxyanisole (BHA) - -     25 Butylhydroxytoluene (BHT) - -     26 Di-Alpha-Tocopherol (Vit E) - -    100 27 Propyl Gallate - -    PERFUMES, FLAVORS & PLANTS        # Substance 2 days 4 days remarks   101 1 Benzyl Cinnamate - -     2 Di-Limonene (Dipentene) - -     3 Cananga Odorata (Corey Nicole) (I) - -     4 Lichen Acid Mix - -    105 5 Mentha Piperita Oil (Peppermint Oil) - -     6 Sesquiterpenelactone mix - -     7 Tea Tree Oil, Oxidized - -     8 Wood Tar Mix - -     9 Abietic Acid - -    110 10 Lavendula Angustifolia Oil (Lavender Oil) - -     11 Fragrance mix II CT * 14% - -      Fragrance Mix I       12 Oakmoss Absolute - -     13 Eugenol - -     14 Geraniol - -    115 15 Hydroxycitronellal - -     16 Isoeugenol - -     17 Cinnamic Aldehyde - -     18 Cinnamic Alcohol  - -      Fragrance mix II       19 Citronellol - -    120 20 Alpha-Hexylcinnamic Aldehyde    - -     21 Citral - -     22 Farnesol - -    123 23 Coumarin - -    Hexylcinnamic aldehyde, Coumarin, Farnesol, Hydroxyisohexy3-cyclohexene carboxaldehyde, citral, citrolellol  RUBBER CHEMICALS        # Substance 2 days 4 days remarks     Carbamate      124 1 Zinc Bis ( Diethyldithio carbamate ) (ZDEC) - -    125 2 Zinc Bis (Dibutyldithiocarbamate) - -     3 1,3-Diphenylguanidine (DPG) - -      Thiourea       4 Dibutylthiourea - -     5 Diphenyltiourea - -     6 Thiourea - -      Mercapto chemicals      130 7 Morpholinyl Mercaptobenzothiazole - -     8 A-Dolgpxghks-6-Benzothiazyl-Sulfenamide - -     9 Dibenzothiazyl Disulfide - -      Thiuram chemicals       10 Dipentamethylenethiuram Disulfide - -     11  Tetraethylthiuram Disulfide (Disulfiram) - -    135 12 Tetramethylthiuram Disulfide - -     13 Tetramethyl Thiuram Monosulfide (TMTM) - -      4-Phenylenediamine derivatives       14 N-Isopropyl-N'-Phenyl-P-Phenylenediamine (IPPD) - -     15 Fyuqyaxs-H-Phgljzoxttvfyjkl (DPPD) - -      Various Rubber Additives       16 Hydroquinone Monobenzylether  - -    140 17 Hexamethylenetetramine - -     18 4,4'-Dihydroxybiphenyl - -     19 Cyclohexylthiophtalimide - -    143 20 N-Phenyl-B-Naphthylamine - -    PLASTICS (Acrylates/Epoxy Systems)       # Substance 2 days 4 days remarks     Acrylates - -    144 1 2-Hydroxyethyl Methacrylate (HEMA) - -    145 2 1,4-Butandioldimethacrylate (BUDMA) - -     3  2-Ethylhexyl Acrylate - -     4 Bisphenol-A-Dimethacrylate  - -     5 Diurethane-Dimethacrylate - -     6 Ethyleneglycoldimethacrylate (EGDMA) - -    150 7 Pentaerythritoltriacrylate (ZAKI) - -     8 Triethylene Glycol Dimethacrylate (TEGDMA) - -      Synthetic material/additives        9 G-Mtcy-Qynxltmkzdr - -     10 Tricresyl Phosphate - -     11 5-Ovmo-Tfvbkgawvuzgi - -    155 12 Dioctyl phtalate(DEHP,DOP) / (Dimethylhexylphthalate)  - -     13 Dibutylphthalate - -     14 Dimethylphthalate - -     15 Toluene-2,4-Diisocyanate - -     16 Diphenylmethane-4,4''-Diisocyanate NA NA      EPOXY RESIN SYSTEMS        Reactive Solvents - -    160 17 Cresyl Glycidyl Ether - -     18 Butyl Glycidyl Ether - -     19 Phenyl Glycidyl Ether - -     20 1,4-Butanediol Diglycidyl Ether - -     21 1,6-Hexanediole Diglycidyl Ether - -      Hardener / Accelerator - -    165 22 Triethylenetetramine - -     23 Diethylenetriamine - -     24 Isophorone Diamine (IPD) - -     25 N,N-Dimethyl-P-Toluidine - -    169 26 Isobornyl Acrylate - -       Results of patch tests:                         Interpretation:  - Negative                    A    = Allergic      (+) Erythema    TI   = Toxic/irritant   + E + Infiltration    RaP = Relevance at  Present     ++ E/I + Papulovesicle   Rpr  = Relevance Previously     +++ E/I/P + Blister     nR   = No Relevance    Atopy Screen (Placed Jul 22, 2024)  No Substance Readings (15 min) Evaluation   POS Histamine 1mg/ml ++    NEG NaCl 0.9% -      No Substance Readings (15 min) Evaluation   1 Alternaria alternata (tenuis)  -    2 Cladosporium herbarum -    3 Aspergillus fumigatus -    4 Penicillium notatum -    5 Dermatophagoides pteronyssinus +    6 Dermatophagoides farinae ?    7 Dog epithelium (canis spp) -    8 Cat hair (michelle catus) +    9 Cockroach   (Blatella americana & germanica) -    10 Grass mix midwest   (Cherelle, Orchard, Redtop, Shade) -    11 Stephen grass (sorghum halepense) -    12 Weed mix   (common Cocklebur, Lamb s quarters, rough redroot Pigweed, Dock/Sorrel) -    13 Mug wort (artemisia vulgare) -    14 Ragweed giant/short (ambrosia spp) -    15 White birch (Betula papyrifera) -    16 Tree mix 1 (Pecan, Maple BHR, Oak RVW, american Alger, black Altheimer) -    17 Red cedar (juniperus virginia) -    18 Tree mix 2   (white Gabriel, river/red Birch, black Walton, common Wil[place], american Elm) -    19 Box elder/Maple mix (acer spp) -    20 Vanzant shagbark (carya ovata) -    Conclusion: Due to questionable/borderline reactions, will perform IDT.    Intradermal Testing (Placed Jul 22, 2024)  No Substance Conc.  Reading (15min)  immediate Papule [mm] / Erythema [mm] Reading   (2 &   4 days)  delayed Papule [mm] / Erythema [mm] Remarks   DF Standard Dust Mite - D. Farinae 1:10 ? 5/5 ++  ++ 10 mm P  12/12    DP Standard Dust Mite - D. Pteronyssinus 1:10 - - +/++  ++ 7 mm P  13/13     A Aspergillus fumigatus  1:10 - - -  - -  -    P Penicillium notatum 1:10 - - +  - 5/5  -     Alternaria alternaria 1:10 - - -  - -  -    Conclusion: Questionable reaction to dust mite Dermatophagoides farinae, but otherwise no signs for immediate-type reaction. After 2 days, delayed-type reaction to dust mites and less to mold  Penicillium. After 4 days, further delayed-type reaction to dust mites, but not to molds.       [x] No relevant allergic reaction observed    [] Allergic reaction diagnosed against following allergens:    Interpretation/Remarks:   The patch tests were negative. But together with the delayed-type reactions to dust mites, we hypothesize that the skin problems are based on dry, hyperirritable skin, more like irritant-type of dermatitis and not an allergic contact dermatitis.    [] Patient information given  [] ACDS CAMP information (# ....) to following compounds:  Standard Search:   Advanced Search:   [] General information to following compounds:       Assessment & Plan:    ==> Final Diagnosis:     # Recurrent dermatitis on hands, face, and acute flares to coban on upper forearms  Most likely irritant dermatitis with underlying topic dermatitis  No signs for allergic contact sensitization  * chronic illness with exacerbation, progression, side effects from treatment    # Atopic predisposition with:  Perennial RC with some seasonal aggravation in late spring/early summer = all blood tests negative, but clearly positive delayed-type reaction to dust mites and less seasonal mold Alternaria  Dry, hypersensitive skin with atopic dermatitis  * chronic illness with exacerbation, progression, side effects from treatment    These conclusions are made at the best of one's knowledge and belief based on the provided evidence such as patient's history and allergy test results and they can change over time or can be incomplete because of missing information.    ==> Treatment Plan:    >> Updated topical regimen:  - Continue tacrolimus (Protopic) 0.1% ointment BID. Can also apply tacrolimus to nose when skin is itchy/scaly (for possible seborrheic dermatitis).  - Only use triamcinolone 0.1% ointment for acute flares for 4-5 days at most, and then on weekends in place of tacrolimus if needed. Reviewed risks of long-term topical  steroid use.    >> For house dust mite allergy:  - Continue with house dust mite reduction as previously discussed.  - Continue with either Flonase or Nasonex nasal sprays QHS PRN.     Procedures Performed: None    Staff Involved: Provider, Staff, Resident, and Scribe    Scribe Disclosure:   I, JAYME ARSHAD, am serving as a scribe to document services personally performed by Francisco Powers MD based on data collection and the provider's statements to me.     Staff Physician Comments:  I was present with the scribe who participated in the documentation of the note. I have verified the history and personally performed the physical exam and medical decision making. I agree with the assessment and plan as documented in the note. I have reviewed and if necessary amended the note.      Also, I saw and evaluated the patient with the resident and I agree with the assessment and plan as documented in the resident's note.    Francisco Powers MD  Professor  Head of Dermato-Allergy Division  Department of Dermatology  [facility]    Follow-up in Derm-Allergy clinic PRN  ___________________________    I spent a total of 30 minutes with Tobias Haynes during today s visit. This time was spent discussing all the individual test results, correlating them to the clinical relevance, counseling the patient and/or coordinating care.      Again, thank you for allowing me to participate in the care of your patient.        Sincerely,        Francisco Powers MD